# Patient Record
Sex: MALE | Race: WHITE | Employment: OTHER | URBAN - METROPOLITAN AREA
[De-identification: names, ages, dates, MRNs, and addresses within clinical notes are randomized per-mention and may not be internally consistent; named-entity substitution may affect disease eponyms.]

---

## 2023-05-29 ENCOUNTER — APPOINTMENT (OUTPATIENT)
Dept: CT IMAGING | Facility: HOSPITAL | Age: 88
End: 2023-05-29
Attending: EMERGENCY MEDICINE
Payer: MEDICARE

## 2023-05-29 ENCOUNTER — HOSPITAL ENCOUNTER (EMERGENCY)
Facility: HOSPITAL | Age: 88
Discharge: HOME | End: 2023-05-29
Attending: EMERGENCY MEDICINE
Payer: MEDICARE

## 2023-05-29 VITALS
TEMPERATURE: 98 F | DIASTOLIC BLOOD PRESSURE: 80 MMHG | RESPIRATION RATE: 18 BRPM | WEIGHT: 170 LBS | HEART RATE: 73 BPM | OXYGEN SATURATION: 100 % | SYSTOLIC BLOOD PRESSURE: 151 MMHG | HEIGHT: 72 IN | BODY MASS INDEX: 23.03 KG/M2

## 2023-05-29 DIAGNOSIS — R31.0 GROSS HEMATURIA: Primary | ICD-10-CM

## 2023-05-29 LAB
ALBUMIN SERPL-MCNC: 2.3 G/DL (ref 3.4–5)
ALBUMIN/GLOB SERPL: 0.5 {RATIO} (ref 1–2)
ALP LIVER SERPL-CCNC: 267 U/L
ALT SERPL-CCNC: 254 U/L
ANION GAP SERPL CALC-SCNC: 5 MMOL/L (ref 0–18)
APTT PPP: 33.2 SECONDS (ref 23.3–35.6)
AST SERPL-CCNC: 308 U/L (ref 15–37)
BASOPHILS # BLD AUTO: 0.03 X10(3) UL (ref 0–0.2)
BASOPHILS NFR BLD AUTO: 0.4 %
BILIRUB SERPL-MCNC: 1.2 MG/DL (ref 0.1–2)
BILIRUB UR QL STRIP.AUTO: NEGATIVE
BUN BLD-MCNC: 22 MG/DL (ref 7–18)
CALCIUM BLD-MCNC: 7.8 MG/DL (ref 8.5–10.1)
CHLORIDE SERPL-SCNC: 117 MMOL/L (ref 98–112)
CLARITY UR REFRACT.AUTO: CLEAR
CO2 SERPL-SCNC: 21 MMOL/L (ref 21–32)
CREAT BLD-MCNC: 1.48 MG/DL
EOSINOPHIL # BLD AUTO: 0.07 X10(3) UL (ref 0–0.7)
EOSINOPHIL NFR BLD AUTO: 0.9 %
ERYTHROCYTE [DISTWIDTH] IN BLOOD BY AUTOMATED COUNT: 15.9 %
GFR SERPLBLD BASED ON 1.73 SQ M-ARVRAT: 45 ML/MIN/1.73M2 (ref 60–?)
GLOBULIN PLAS-MCNC: 4.3 G/DL (ref 2.8–4.4)
GLUCOSE BLD-MCNC: 118 MG/DL (ref 70–99)
GLUCOSE UR STRIP.AUTO-MCNC: NEGATIVE MG/DL
HCT VFR BLD AUTO: 36.5 %
HGB BLD-MCNC: 11.9 G/DL
IMM GRANULOCYTES # BLD AUTO: 0.04 X10(3) UL (ref 0–1)
IMM GRANULOCYTES NFR BLD: 0.5 %
INR BLD: 1.3 (ref 0.85–1.16)
KETONES UR STRIP.AUTO-MCNC: NEGATIVE MG/DL
LYMPHOCYTES # BLD AUTO: 1.3 X10(3) UL (ref 1–4)
LYMPHOCYTES NFR BLD AUTO: 16.7 %
MCH RBC QN AUTO: 31.4 PG (ref 26–34)
MCHC RBC AUTO-ENTMCNC: 32.6 G/DL (ref 31–37)
MCV RBC AUTO: 96.3 FL
MONOCYTES # BLD AUTO: 0.69 X10(3) UL (ref 0.1–1)
MONOCYTES NFR BLD AUTO: 8.9 %
NEUTROPHILS # BLD AUTO: 5.64 X10 (3) UL (ref 1.5–7.7)
NEUTROPHILS # BLD AUTO: 5.64 X10(3) UL (ref 1.5–7.7)
NEUTROPHILS NFR BLD AUTO: 72.6 %
NITRITE UR QL STRIP.AUTO: NEGATIVE
OSMOLALITY SERPL CALC.SUM OF ELEC: 300 MOSM/KG (ref 275–295)
PH UR STRIP.AUTO: 6 [PH] (ref 5–8)
PLATELET # BLD AUTO: 179 10(3)UL (ref 150–450)
POTASSIUM SERPL-SCNC: 3.6 MMOL/L (ref 3.5–5.1)
PROT SERPL-MCNC: 6.6 G/DL (ref 6.4–8.2)
PROT UR STRIP.AUTO-MCNC: 100 MG/DL
PROTHROMBIN TIME: 16.1 SECONDS (ref 11.6–14.8)
RBC # BLD AUTO: 3.79 X10(6)UL
RBC #/AREA URNS AUTO: >10 /HPF
SODIUM SERPL-SCNC: 143 MMOL/L (ref 136–145)
SP GR UR STRIP.AUTO: 1.02 (ref 1–1.03)
UROBILINOGEN UR STRIP.AUTO-MCNC: 4 MG/DL
WBC # BLD AUTO: 7.8 X10(3) UL (ref 4–11)

## 2023-05-29 PROCEDURE — 87086 URINE CULTURE/COLONY COUNT: CPT | Performed by: EMERGENCY MEDICINE

## 2023-05-29 PROCEDURE — 85025 COMPLETE CBC W/AUTO DIFF WBC: CPT | Performed by: EMERGENCY MEDICINE

## 2023-05-29 PROCEDURE — 81001 URINALYSIS AUTO W/SCOPE: CPT | Performed by: EMERGENCY MEDICINE

## 2023-05-29 PROCEDURE — 99285 EMERGENCY DEPT VISIT HI MDM: CPT

## 2023-05-29 PROCEDURE — 96365 THER/PROPH/DIAG IV INF INIT: CPT

## 2023-05-29 PROCEDURE — 85610 PROTHROMBIN TIME: CPT | Performed by: EMERGENCY MEDICINE

## 2023-05-29 PROCEDURE — 74176 CT ABD & PELVIS W/O CONTRAST: CPT | Performed by: EMERGENCY MEDICINE

## 2023-05-29 PROCEDURE — 85730 THROMBOPLASTIN TIME PARTIAL: CPT | Performed by: EMERGENCY MEDICINE

## 2023-05-29 PROCEDURE — 80053 COMPREHEN METABOLIC PANEL: CPT | Performed by: EMERGENCY MEDICINE

## 2023-05-29 RX ORDER — ASCORBIC ACID 500 MG
TABLET ORAL
COMMUNITY

## 2023-05-29 RX ORDER — CHOLECALCIFEROL (VITAMIN D3) 125 MCG
500 CAPSULE ORAL DAILY
COMMUNITY

## 2023-05-29 RX ORDER — AMOXICILLIN 500 MG/1
500 TABLET, FILM COATED ORAL 2 TIMES DAILY
COMMUNITY

## 2023-05-29 NOTE — ED PROVIDER NOTES
CT ABDOMEN+PELVIS(CPT=74176)    Result Date: 5/29/2023  PROCEDURE:  CT ABDOMEN+PELVIS (CPT=74176)  LOCATION:  [facility]   COMPARISON:  None. INDICATIONS:  Bladder infection, bleeding with urination  TECHNIQUE:  Unenhanced multislice CT scanning was performed from the dome of the diaphragm to the pubic symphysis. Dose reduction techniques were used. Dose information is transmitted to the [facility]) NRDR ([address]) which includes the Dose Index Registry. PATIENT STATED HISTORY: (As transcribed by Technologist)  Patient had bladder infection and bleeding with urination. FINDINGS:  LIVER:  Right hepatic lobe 2.0 x 1.3 cm cyst. BILIARY:  Calcified gallstones in the gallbladder are noted. There is suggestion of subtle inflammatory changes adjacent to the gallbladder. PANCREAS:  No lesion, fluid collection, ductal dilatation, or atrophy. SPLEEN:  No enlargement or focal lesion. KIDNEYS:  Representative right renal parapelvic cyst measures 3.1 x 2.7 cm. Atrophic left kidney is noted. Left ureter is mildly distended with high density material that may represent blood. Cyst off the lower pole of the left kidney measures 2.9 x 2.8 cm. ADRENALS:  Left adrenal nodule measuring 1.5 x 1.1 cm measures 22 Hounsfield units. AORTA/VASCULAR:    Vascular calcifications are noted. RETROPERITONEUM:  No mass or adenopathy. BOWEL/MESENTERY:  Diverticulosis of the colon is noted. No evidence of acute diverticulitis. ABDOMINAL WALL:  No mass or hernia. URINARY BLADDER:  High density in the bladder is likely due to hemorrhage. Pat catheter is noted. PELVIC NODES:  No adenopathy. PELVIC ORGANS:  No visible mass. Pelvic organs appropriate for patient age. 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 LUNG BASES:  Small right pleural effusion along with bilateral lower lobe patchy consolidations. OTHER:  Negative. CONCLUSION:   1. Small right pleural effusion along with small bilateral lower lobe regions of atelectasis with minimal consolidation. 2. Left adrenal nodule measuring 1.5 x 1.1 cm is noted. Comparison with outside examination to demonstrate stability may be done. A nonemergent outpatient CT adrenal protocol may be done for further evaluation if needed. 3. Multiple calcified gallstones in the gallbladder with subtle inflammatory changes adjacent to the gallbladder extending towards the pancreatic head and duodenum is noted. This may represent developing acute cholecystitis. A follow-up ultrasound and/or HIDA scan may be done. 4. 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 Dictated by (CST): Alanna Salgado MD on 5/29/2023 at 4:26 PM     Finalized by (CST): Alanna Salgado MD on 5/29/2023 at 4:31 PM       ED RN irrigated Pat with a few small clots but no gross hematuria and Pat was draining freely. CT scan as above was discussed at length with the patient and patient's son who is a physician. Patient has no right upper quadrant tenderness and they do not want to pursue any further imaging of the gallbladder. With regard to the patient's elevated LFTs, this is been a known issue that has been followed by PCP in the [place] area, thought to be due to cholesterol medication, son states levels now actually seem to be trending down versus prior. Spoke again to urology.   Okay to continue amoxicillin and the office will reach out to the family this week to schedule follow-up

## 2023-05-29 NOTE — ED INITIAL ASSESSMENT (HPI)
Recently dx with UTI, states he is now having hematuria and had pain in his bladder last night. Denies any thinners. Denies fevers. Indwelling luis for retention. On amoxicillin. Recently stopped aspirin and statin.

## 2023-10-11 ENCOUNTER — HOSPITAL ENCOUNTER (INPATIENT)
Facility: HOSPITAL | Age: 88
LOS: 2 days | Discharge: HOME | End: 2023-10-13
Attending: EMERGENCY MEDICINE | Admitting: INTERNAL MEDICINE
Payer: MEDICARE

## 2023-10-11 ENCOUNTER — HOSPITAL ENCOUNTER (INPATIENT)
Facility: HOSPITAL | Age: 88
LOS: 2 days | Discharge: NURSING, REHAB OR LONG-TERM CARE | DRG: 696 | End: 2023-10-13
Attending: EMERGENCY MEDICINE | Admitting: INTERNAL MEDICINE
Payer: MEDICARE

## 2023-10-11 DIAGNOSIS — R33.9 URINARY RETENTION: ICD-10-CM

## 2023-10-11 DIAGNOSIS — R31.0 GROSS HEMATURIA: Primary | ICD-10-CM

## 2023-10-11 PROBLEM — D64.9 ANEMIA: Status: ACTIVE | Noted: 2023-10-11

## 2023-10-11 PROBLEM — E87.0 HYPERNATREMIA: Status: ACTIVE | Noted: 2023-10-11

## 2023-10-11 LAB
ALBUMIN SERPL-MCNC: 2.3 G/DL (ref 3.4–5)
ALBUMIN/GLOB SERPL: 0.5 {RATIO} (ref 1–2)
ALP LIVER SERPL-CCNC: 110 U/L
ALT SERPL-CCNC: 45 U/L
ANION GAP SERPL CALC-SCNC: 1 MMOL/L (ref 0–18)
AST SERPL-CCNC: 52 U/L (ref 15–37)
BASOPHILS # BLD AUTO: 0.04 X10(3) UL (ref 0–0.2)
BASOPHILS NFR BLD AUTO: 0.5 %
BILIRUB SERPL-MCNC: 0.4 MG/DL (ref 0.1–2)
BUN BLD-MCNC: 27 MG/DL (ref 7–18)
CALCIUM BLD-MCNC: 7.9 MG/DL (ref 8.5–10.1)
CHLORIDE SERPL-SCNC: 120 MMOL/L (ref 98–112)
CO2 SERPL-SCNC: 25 MMOL/L (ref 21–32)
CREAT BLD-MCNC: 1.63 MG/DL
EGFRCR SERPLBLD CKD-EPI 2021: 40 ML/MIN/1.73M2 (ref 60–?)
EOSINOPHIL # BLD AUTO: 0.23 X10(3) UL (ref 0–0.7)
EOSINOPHIL NFR BLD AUTO: 2.7 %
ERYTHROCYTE [DISTWIDTH] IN BLOOD BY AUTOMATED COUNT: 14.8 %
GLOBULIN PLAS-MCNC: 4.7 G/DL (ref 2.8–4.4)
GLUCOSE BLD-MCNC: 94 MG/DL (ref 70–99)
HCT VFR BLD AUTO: 35.4 %
HGB BLD-MCNC: 11.3 G/DL
IMM GRANULOCYTES # BLD AUTO: 0.06 X10(3) UL (ref 0–1)
IMM GRANULOCYTES NFR BLD: 0.7 %
LYMPHOCYTES # BLD AUTO: 2.06 X10(3) UL (ref 1–4)
LYMPHOCYTES NFR BLD AUTO: 24 %
MCH RBC QN AUTO: 31.8 PG (ref 26–34)
MCHC RBC AUTO-ENTMCNC: 31.9 G/DL (ref 31–37)
MCV RBC AUTO: 99.7 FL
MONOCYTES # BLD AUTO: 0.74 X10(3) UL (ref 0.1–1)
MONOCYTES NFR BLD AUTO: 8.6 %
NEUTROPHILS # BLD AUTO: 5.45 X10 (3) UL (ref 1.5–7.7)
NEUTROPHILS # BLD AUTO: 5.45 X10(3) UL (ref 1.5–7.7)
NEUTROPHILS NFR BLD AUTO: 63.5 %
OSMOLALITY SERPL CALC.SUM OF ELEC: 307 MOSM/KG (ref 275–295)
PLATELET # BLD AUTO: 219 10(3)UL (ref 150–450)
POTASSIUM SERPL-SCNC: 4.3 MMOL/L (ref 3.5–5.1)
PROT SERPL-MCNC: 7 G/DL (ref 6.4–8.2)
RBC # BLD AUTO: 3.55 X10(6)UL
SODIUM SERPL-SCNC: 146 MMOL/L (ref 136–145)
SP GR UR REFRACTOMETRY: 1.03 (ref 1–1.03)
WBC # BLD AUTO: 8.6 X10(3) UL (ref 4–11)

## 2023-10-11 PROCEDURE — 99223 1ST HOSP IP/OBS HIGH 75: CPT | Performed by: INTERNAL MEDICINE

## 2023-10-11 PROCEDURE — 0T2BX0Z CHANGE DRAINAGE DEVICE IN BLADDER, EXTERNAL APPROACH: ICD-10-PCS | Performed by: EMERGENCY MEDICINE

## 2023-10-11 RX ORDER — ONDANSETRON 2 MG/ML
4 INJECTION INTRAMUSCULAR; INTRAVENOUS EVERY 6 HOURS PRN
Status: DISCONTINUED | OUTPATIENT
Start: 2023-10-11 | End: 2023-10-13

## 2023-10-11 RX ORDER — MELATONIN
3 NIGHTLY PRN
Status: DISCONTINUED | OUTPATIENT
Start: 2023-10-11 | End: 2023-10-13

## 2023-10-11 RX ORDER — LIDOCAINE HYDROCHLORIDE 20 MG/ML
JELLY TOPICAL
Status: COMPLETED
Start: 2023-10-11 | End: 2023-10-11

## 2023-10-11 RX ORDER — SENNOSIDES 8.6 MG
17.2 TABLET ORAL NIGHTLY PRN
Status: DISCONTINUED | OUTPATIENT
Start: 2023-10-11 | End: 2023-10-13

## 2023-10-11 RX ORDER — POLYETHYLENE GLYCOL 3350 17 G/17G
17 POWDER, FOR SOLUTION ORAL DAILY PRN
Status: DISCONTINUED | OUTPATIENT
Start: 2023-10-11 | End: 2023-10-13

## 2023-10-11 RX ORDER — LIDOCAINE HYDROCHLORIDE 20 MG/ML
10 JELLY TOPICAL ONCE
Status: COMPLETED | OUTPATIENT
Start: 2023-10-11 | End: 2023-10-11

## 2023-10-11 RX ORDER — DEXTROSE MONOHYDRATE 50 MG/ML
INJECTION, SOLUTION INTRAVENOUS CONTINUOUS
Status: DISCONTINUED | OUTPATIENT
Start: 2023-10-11 | End: 2023-10-12

## 2023-10-11 RX ORDER — BISACODYL 10 MG
10 SUPPOSITORY, RECTAL RECTAL
Status: DISCONTINUED | OUTPATIENT
Start: 2023-10-11 | End: 2023-10-13

## 2023-10-12 LAB
ANION GAP SERPL CALC-SCNC: 5 MMOL/L (ref 0–18)
BASOPHILS # BLD AUTO: 0.03 X10(3) UL (ref 0–0.2)
BASOPHILS NFR BLD AUTO: 0.3 %
BUN BLD-MCNC: 28 MG/DL (ref 7–18)
CALCIUM BLD-MCNC: 7.7 MG/DL (ref 8.5–10.1)
CHLORIDE SERPL-SCNC: 121 MMOL/L (ref 98–112)
CO2 SERPL-SCNC: 23 MMOL/L (ref 21–32)
CREAT BLD-MCNC: 1.41 MG/DL
EGFRCR SERPLBLD CKD-EPI 2021: 47 ML/MIN/1.73M2 (ref 60–?)
EOSINOPHIL # BLD AUTO: 0.17 X10(3) UL (ref 0–0.7)
EOSINOPHIL NFR BLD AUTO: 1.9 %
ERYTHROCYTE [DISTWIDTH] IN BLOOD BY AUTOMATED COUNT: 14.9 %
GLUCOSE BLD-MCNC: 93 MG/DL (ref 70–99)
HCT VFR BLD AUTO: 30.5 %
HGB BLD-MCNC: 9.7 G/DL
IMM GRANULOCYTES # BLD AUTO: 0.05 X10(3) UL (ref 0–1)
IMM GRANULOCYTES NFR BLD: 0.6 %
LYMPHOCYTES # BLD AUTO: 1.89 X10(3) UL (ref 1–4)
LYMPHOCYTES NFR BLD AUTO: 20.8 %
MCH RBC QN AUTO: 31.2 PG (ref 26–34)
MCHC RBC AUTO-ENTMCNC: 31.8 G/DL (ref 31–37)
MCV RBC AUTO: 98.1 FL
MONOCYTES # BLD AUTO: 0.73 X10(3) UL (ref 0.1–1)
MONOCYTES NFR BLD AUTO: 8 %
NEUTROPHILS # BLD AUTO: 6.21 X10 (3) UL (ref 1.5–7.7)
NEUTROPHILS # BLD AUTO: 6.21 X10(3) UL (ref 1.5–7.7)
NEUTROPHILS NFR BLD AUTO: 68.4 %
OSMOLALITY SERPL CALC.SUM OF ELEC: 313 MOSM/KG (ref 275–295)
PLATELET # BLD AUTO: 187 10(3)UL (ref 150–450)
POTASSIUM SERPL-SCNC: 4.2 MMOL/L (ref 3.5–5.1)
RBC # BLD AUTO: 3.11 X10(6)UL
SODIUM SERPL-SCNC: 139 MMOL/L (ref 136–145)
SODIUM SERPL-SCNC: 149 MMOL/L (ref 136–145)
WBC # BLD AUTO: 9.1 X10(3) UL (ref 4–11)

## 2023-10-12 PROCEDURE — 99232 SBSQ HOSP IP/OBS MODERATE 35: CPT | Performed by: HOSPITALIST

## 2023-10-12 RX ORDER — DEXTROSE MONOHYDRATE 50 MG/ML
INJECTION, SOLUTION INTRAVENOUS CONTINUOUS
Status: DISCONTINUED | OUTPATIENT
Start: 2023-10-12 | End: 2023-10-12

## 2023-10-12 NOTE — PLAN OF CARE
Patient unaware of what home medication he takes. Refused to allow this RN call family to verify. Pt states he will have his wife or son inform us about medication when they arrive tomorrow morning. MD made aware.

## 2023-10-12 NOTE — ED QUICK NOTES
Orders for admission, patient is aware of plan and ready to go upstairs. Any questions, please call ED RN Vivian Irby at extension [phone].      Patient Covid vaccination status: Unvaccinated     COVID Test Ordered in ED: None    COVID Suspicion at Admission: N/A    Running Infusions:  None    Mental Status/LOC at time of transport: A&Ox3    Other pertinent information: NPO, has 24 F 3 way catheter in with CBI going, 3rd bag going  CIWA score: N/A   NIH score:  N/A

## 2023-10-12 NOTE — CM/SW NOTE
10/12/23 1300   CM/SW Referral Data   Referral Source Social Work (self-referral)   Reason for Referral Discharge planning   Informant Patient;EMR;Clinical Staff Member   Patient Info   Patient's Current Mental Status at Time of Assessment Alert;Oriented   Patient's Home Environment Assisted Living   Post Acute Care Provider Upon Admission [facility]   Patient lives with Spouse/Significant other   Discharge Needs   Anticipated D/C needs Assisted/Supported living;Transportation services       Order received for advance directives. Pt is a 81 y/o man admitted with gross hematuria. PT recommending home at [place]. Met with pt who stated he has lives at [facility] for about 4 months. He shares an apartment with his wife who has dementia. Pt confirmed that he does have advance directives. He is eager to return to [facility] and hoping for [place] soon. Contacted [facility] and spoke with RN who confirmed advance directives on file. She will fax records to add to pt's hospital chart. / to remain available for support and/or discharge planning. At [place], RN please call report to:   [address] [phone]    Itzel Martinez [facility]  Discharge Planner  [phone]    Addendum: Received call from [facility] clarifying that they do not have any advance directives on file. Pt is full code. Updated RN. Recommended consult to  if pt/family would like to establish HCPOA. MD can discuss code status and POLST completed as appropriate.

## 2023-10-12 NOTE — PHYSICAL THERAPY NOTE
PHYSICAL THERAPY EVALUATION - INPATIENT     Room Number: 668/994-H  Evaluation Date: 10/12/2023  Type of Evaluation: Initial  Physician Order: PT Eval and Treat    Presenting Problem: gross hematuria  Co-Morbidities : HTN, neuropathy, luis, urinary retention  Reason for Therapy: Mobility Dysfunction and Discharge Planning    History related to current admission: Patient is a 80year old male admitted on 10/11/2023 from home for gross hematuria. Urology following. ASSESSMENT   In this PT evaluation, the patient presents with the following impairments decreased functional activity tolerance. These impairments and comorbidities manifest themselves as functional limitations in independent bed mobility, transfers, and gait. The patient is below baseline and would benefit from skilled inpatient PT to address the above deficits to assist patient in returning to prior to level of function. Functional outcome measures completed include [facility]. The AM-PAC '6-Clicks' Inpatient Basic Mobility Short Form was completed and this patient is demonstrating a Approx Degree of Impairment: 35.83%  degree of impairment in mobility. Research supports that patients with this level of impairment may benefit from home. DISCHARGE RECOMMENDATIONS  PT Discharge Recommendations: Home    PLAN  PT Treatment Plan: Bed mobility; Endurance; Energy conservation; Family education;Patient education;Gait training;Range of motion;Strengthening;Transfer training;Balance training  Rehab Potential : Good  Frequency (Obs): 3-5x/week  Number of Visits to Meet Established Goals: 1      CURRENT GOALS    Goal #1 Patient is able to demonstrate supine - sit EOB @ level: modified independent     Goal #2 Patient is able to demonstrate transfers Sit to/from Stand at assistance level: modified independent     Goal #3 Patient is able to ambulate 150 feet with assist device: walker - rolling at assistance level: supervision     Goal #4    Goal #5 Goal #6    Goal Comments: Goals established on 10/12/2023    HOME SITUATION  Type of Home: Assisted living facility [facility])   Home Layout: One level                Lives With: Staff 24 hours  Drives: Yes  Patient Owned Equipment: Rolling walker       Prior Level of [place]: Pt is typically mod ind with RW and mod ind for ADLs. Pt reports that starting next week he is going to get assist with showers and \"cleaning up after the luis\". SUBJECTIVE  Pt pleasant and cooperative during session. OBJECTIVE  Precautions: Bed/chair alarm  Fall Risk: Standard fall risk    WEIGHT BEARING RESTRICTION  Weight Bearing Restriction: None                PAIN ASSESSMENT  Ratin          COGNITION  Overall Cognitive Status:  WFL - within functional limits    RANGE OF MOTION AND STRENGTH ASSESSMENT  Upper extremity ROM and strength are within functional limits     Lower extremity ROM is within functional limits     Lower extremity strength is within functional limits       BALANCE  Static Sitting: Good  Dynamic Sitting: Good  Static Standing: Fair -  Dynamic Standing: Fair -    ADDITIONAL TESTS                                  ACTIVITY TOLERANCE   Asymptomatic vitals during session                      O2 WALK       NEUROLOGICAL FINDINGS                        AM-PAC '6-Clicks' INPATIENT SHORT FORM - BASIC MOBILITY  How much difficulty does the patient currently have. .. Patient Difficulty: Turning over in bed (including adjusting bedclothes, sheets and blankets)?: None   Patient Difficulty: Sitting down on and standing up from a chair with arms (e.g., wheelchair, bedside commode, etc.): A Little   Patient Difficulty: Moving from lying on back to sitting on the side of the bed?: None   How much help from another person does the patient currently need. ..    Help from Another: Moving to and from a bed to a chair (including a wheelchair)?: A Little   Help from Another: Need to walk in hospital room?: A Little   Help from Another: Climbing 3-5 steps with a railing?: A Little       AM-PAC Score:  Raw Score: 20   Approx Degree of Impairment: 35.83%   Standardized Score (AM-PAC Scale): 47.67   CMS Modifier (G-Code): CJ    FUNCTIONAL ABILITY STATUS  Gait Assessment   Functional Mobility/Gait Assessment  Gait Assistance: Supervision  Distance (ft): 2  Assistive Device: Rolling walker  Pattern: Within Functional Limits    Skilled Therapy Provided: Per RN okay to work with pt. Pt received in chair and was agreeable to therapy session. Bed Mobility:  Rolling: mod ind   Supine to sit: NT   Sit to supine: supervision     Transfer Mobility:  Sit to stand: supervision   Stand to sit: supervision  Gait = pt ambulated from chair to bed with RW and supervision     Therapist's Comments: Pt educated on role of therapy, goals for session, safety, fall prevention, activity recommendation, and discharge planning. Exercise/Education Provided:  Bed mobility  Energy conservation  Functional activity tolerated  Gait training  Posture  Strengthening  Transfer training    Patient End of Session: In bed; With [facility] staff;Needs met;Call light within reach;RN aware of session/findings; All patient questions and concerns addressed;SCDs in place; Alarm set; Discussed recommendations with /      Patient Evaluation Complexity Level:  History Moderate - 1 or 2 personal factors and/or co-morbidities   Examination of body systems Low - addressing 1-2 elements   Clinical Presentation Low - Stable   Clinical Decision Making Low - Stable       PT Session Time: 15 minutes

## 2023-10-12 NOTE — PLAN OF CARE
Patient alert and oriented x4. VSS, on RA. Pt denies pain. IVF infusing per orders. Tolerating PO intake, denies N/V. Pt up x1 person assist with walker. CBI running at moderate rate, output pink/clear, intermittent clots in tubing. No hand irrigation. Plan: continue CBI.

## 2023-10-12 NOTE — PLAN OF CARE
Pt A&O x 4 , on RA, BM-10/12-large BM, Pain controlled with current regimen, up with x1ast with walker, dressing  to L elbow and Coccyx -cdi, CBI running-fast, triple lumen luis intact, IVF running, , VSS, SCD's in place, BSC.

## 2023-10-13 VITALS
OXYGEN SATURATION: 94 % | RESPIRATION RATE: 16 BRPM | TEMPERATURE: 99 F | SYSTOLIC BLOOD PRESSURE: 95 MMHG | DIASTOLIC BLOOD PRESSURE: 42 MMHG | BODY MASS INDEX: 20.02 KG/M2 | HEIGHT: 71 IN | HEART RATE: 92 BPM | WEIGHT: 143 LBS

## 2023-10-13 LAB
ANION GAP SERPL CALC-SCNC: 3 MMOL/L (ref 0–18)
BASOPHILS # BLD AUTO: 0.04 X10(3) UL (ref 0–0.2)
BASOPHILS NFR BLD AUTO: 0.4 %
BUN BLD-MCNC: 25 MG/DL (ref 7–18)
CALCIUM BLD-MCNC: 7.8 MG/DL (ref 8.5–10.1)
CHLORIDE SERPL-SCNC: 118 MMOL/L (ref 98–112)
CO2 SERPL-SCNC: 22 MMOL/L (ref 21–32)
CREAT BLD-MCNC: 1.15 MG/DL
EGFRCR SERPLBLD CKD-EPI 2021: 60 ML/MIN/1.73M2 (ref 60–?)
EOSINOPHIL # BLD AUTO: 0.24 X10(3) UL (ref 0–0.7)
EOSINOPHIL NFR BLD AUTO: 2.5 %
ERYTHROCYTE [DISTWIDTH] IN BLOOD BY AUTOMATED COUNT: 14.6 %
GLUCOSE BLD-MCNC: 82 MG/DL (ref 70–99)
HCT VFR BLD AUTO: 27.6 %
HGB BLD-MCNC: 9 G/DL
IMM GRANULOCYTES # BLD AUTO: 0.05 X10(3) UL (ref 0–1)
IMM GRANULOCYTES NFR BLD: 0.5 %
LYMPHOCYTES # BLD AUTO: 2.15 X10(3) UL (ref 1–4)
LYMPHOCYTES NFR BLD AUTO: 22.6 %
MCH RBC QN AUTO: 31.7 PG (ref 26–34)
MCHC RBC AUTO-ENTMCNC: 32.6 G/DL (ref 31–37)
MCV RBC AUTO: 97.2 FL
MONOCYTES # BLD AUTO: 0.74 X10(3) UL (ref 0.1–1)
MONOCYTES NFR BLD AUTO: 7.8 %
NEUTROPHILS # BLD AUTO: 6.28 X10 (3) UL (ref 1.5–7.7)
NEUTROPHILS # BLD AUTO: 6.28 X10(3) UL (ref 1.5–7.7)
NEUTROPHILS NFR BLD AUTO: 66.2 %
OSMOLALITY SERPL CALC.SUM OF ELEC: 299 MOSM/KG (ref 275–295)
PLATELET # BLD AUTO: 184 10(3)UL (ref 150–450)
POTASSIUM SERPL-SCNC: 4.3 MMOL/L (ref 3.5–5.1)
RBC # BLD AUTO: 2.84 X10(6)UL
SODIUM SERPL-SCNC: 143 MMOL/L (ref 136–145)
WBC # BLD AUTO: 9.5 X10(3) UL (ref 4–11)

## 2023-10-13 PROCEDURE — 99232 SBSQ HOSP IP/OBS MODERATE 35: CPT | Performed by: HOSPITALIST

## 2023-10-13 NOTE — PLAN OF CARE
Patient Aox3 this morning, denies pain at rest, VSS on RA, neurovascularly intact, CBI tubing present, currently clamped, urine to tubing has faint rust color, monitoring output to look for change of color, if urine remains the same color will change to 18 [place] cath prior to discharge.

## 2023-10-13 NOTE — PROGRESS NOTES
Patient luis draining faint rust tinted urine to tubing, color has appeared the same as it was this morning with CBI clamped, no clots present, will switch to 18 Romansh catheter per urology orders and plan for discharge this evening.

## 2023-10-13 NOTE — PLAN OF CARE
A&O x2-3 overnight, frequently confused/forgetful. RN spoke with RN at [facility] who confirms this is baseline for pt. VSS on RA. . Denies pain. Ambulating with min assist with walker. 3 way luis in place. CBI running at fast rate, output cherry/pink with some clots. Manual irrigation done x1. Bilateral SCDs. Fluid restriction maintained. Reviewed POC, pain management, IS use, and fall precautions with pt. Bed alarm on w/bed in lowest position. Pt reminded to use call light.

## 2023-10-13 NOTE — CM/SW NOTE
Spoke with pt's RN who stated pt can return to [facility] today. Updated Renny Moffett at [facility]. Spoke with pt's son who can provide transport at 3-4pm today. No other DC needs/concerns at this time. Updated RN who will call report to intermediate. / to remain available for support and/or discharge planning.      RN please call report to:   [address] [phone]    Keny Mariano [facility]  Discharge Planner  [phone]

## 2023-10-13 NOTE — DISCHARGE INSTRUCTIONS
Doctor has asked you to schedule a Cystoscopy    This is a procedure done in the office. There is no preparation needed on your part. A Cystoscopy is a test that allows your doctor to look the inside the bladder and the urethra using a thin, lighted instrument called a cystoscope. The cystoscope is inserted into your urethra and slowly advanced into the bladder. A cystoscopy allows your doctor to look at areas of your bladder and urethra that usually do not show up well on X-rays. Tiny surgical instruments can be inserted through the cystoscope that allow your doctor to remove samples of tissue (biopsy) or samples of urine. Small bladder stones and some small growths can be removed during cystoscopy. This may eliminate the need for more extensive surgery. Why It Is Done  Find the cause of symptoms such as blood in the urine (hematuria), painful urination (dysuria), urinary incontinence, urinary frequency or hesitancy, an inability to pass urine (retention), or a sudden and overwhelming need to urinate (urgency). Find the cause of problems of the urinary tract, such as frequent, repeated urinary tract infections or urinary tract infections that do not respond to treatment. Look for problems in the urinary tract, such as blockage in the urethra caused by an enlarged prostate, kidney stones, or tumors. Evaluate problems that cannot be seen on X-ray or to further investigate problems detected by ultrasound or during intravenous pyelography, such as kidney stones or tumors. Remove tissue samples for biopsy. Remove foreign objects. Treat urinary tract problems. For example, cystoscopy can be done to remove urinary tract stones or growths, treat bleeding in the bladder, relieve blockages in the urethra, or treat or remove tumors. How To Prepare  You may eat and drink normally before the procedure. You may be asked to give a urine sample at the time of your procedure. Please do not urinate before.   How It Is Done  A cystoscopy is performed by a urologist, with one or more assistants. The test is done in a special testing room in the doctor's office. You will undress from the waist down, and be given a paper drape to cover yourself. You will lie on your back on a special table. Females will have their knees bent and feet placed in stirrups. Males will lay flat on the table, legs straight. Your genital area is cleaned with an antiseptic solution. A local anesthetic jelly will be inserted into the urethra. No needles are used. After the anesthetic takes effect, a well-lubricated cystoscope is inserted into your urethra and slowly advanced into your bladder. If your urethra has a spot that is too narrow to allow the scope to pass, other smaller instruments are inserted first to gradually enlarge the opening. After the cystoscope is inside your bladder, sterile water runs through the scope to help expand your bladder and to create a clear view. Tiny instruments may be inserted through the scope to collect tissue samples for a biopsy if necessary; the tissue samples are sent to the laboratory for analysis. Cautery may be used if bleeding occurs from a specimen site. The cystoscope is usually in your bladder for only 1-2 minutes. But the entire test with preparation may take up to 20 minutes or longer. You will be able to get up immediately following the procedure and proceed with normal daily activities. After the test  You may need to urinate frequently. You may experience some burning during urination for a day or two. Drink lots of fluids to help minimize the burning and help prevent a urinary tract infection. You may also see a tinge of blood in the urine for 2-3 days. Some patients experience pain for a few days afterwards. This is normal.  If the pain is intolerable please contact our office or go to the nearest Emergency Room/Immediate Care.    You will be given an instruction sheet following your cystoscopy with post procedure instructions. Results  Your doctor may be able to talk to you about the results during the cystoscopy. If a biopsy is preformed, the results usually take several days to become available. You will be called promptly with results.

## 2023-10-14 ENCOUNTER — HOSPITAL ENCOUNTER (INPATIENT)
Facility: HOSPITAL | Age: 88
LOS: 2 days | Discharge: HOME HEALTH | DRG: 717 | End: 2023-10-16
Attending: EMERGENCY MEDICINE | Admitting: HOSPITALIST

## 2023-10-14 ENCOUNTER — APPOINTMENT (OUTPATIENT)
Dept: GENERAL RADIOLOGY | Facility: HOSPITAL | Age: 88
DRG: 717 | End: 2023-10-14
Attending: EMERGENCY MEDICINE

## 2023-10-14 DIAGNOSIS — J18.9 COMMUNITY ACQUIRED PNEUMONIA OF RIGHT LOWER LOBE OF LUNG: ICD-10-CM

## 2023-10-14 DIAGNOSIS — K92.2 GASTROINTESTINAL HEMORRHAGE, UNSPECIFIED GASTROINTESTINAL HEMORRHAGE TYPE: ICD-10-CM

## 2023-10-14 DIAGNOSIS — I95.1 ORTHOSTATIC HYPOTENSION: Primary | ICD-10-CM

## 2023-10-14 DIAGNOSIS — R31.0 GROSS HEMATURIA: ICD-10-CM

## 2023-10-14 PROBLEM — I95.9 HYPOTENSION: Status: ACTIVE | Noted: 2023-10-14

## 2023-10-14 PROBLEM — R31.9 HEMATURIA: Status: ACTIVE | Noted: 2023-10-14

## 2023-10-14 PROBLEM — E87.20 METABOLIC ACIDOSIS: Status: ACTIVE | Noted: 2023-10-14

## 2023-10-14 PROBLEM — N17.9 ACUTE KIDNEY INJURY (HCC): Status: ACTIVE | Noted: 2023-10-14

## 2023-10-14 PROBLEM — D69.6 THROMBOCYTOPENIA (HCC): Status: ACTIVE | Noted: 2023-10-14

## 2023-10-14 LAB
ADENOVIRUS PCR:: NOT DETECTED
ALBUMIN SERPL-MCNC: 2.1 G/DL (ref 3.4–5)
ALBUMIN/GLOB SERPL: 0.5 {RATIO} (ref 1–2)
ALP LIVER SERPL-CCNC: 84 U/L
ALT SERPL-CCNC: 34 U/L
ANION GAP SERPL CALC-SCNC: 4 MMOL/L (ref 0–18)
ANTIBODY SCREEN: NEGATIVE
AST SERPL-CCNC: 44 U/L (ref 15–37)
B PARAPERT DNA SPEC QL NAA+PROBE: NOT DETECTED
B PERT DNA SPEC QL NAA+PROBE: NOT DETECTED
BASOPHILS # BLD AUTO: 0.02 X10(3) UL (ref 0–0.2)
BASOPHILS NFR BLD AUTO: 0.2 %
BILIRUB SERPL-MCNC: 0.6 MG/DL (ref 0.1–2)
BUN BLD-MCNC: 32 MG/DL (ref 7–18)
C PNEUM DNA SPEC QL NAA+PROBE: NOT DETECTED
CALCIUM BLD-MCNC: 7.8 MG/DL (ref 8.5–10.1)
CHLORIDE SERPL-SCNC: 118 MMOL/L (ref 98–112)
CO2 SERPL-SCNC: 21 MMOL/L (ref 21–32)
CORONAVIRUS 229E PCR:: NOT DETECTED
CORONAVIRUS HKU1 PCR:: NOT DETECTED
CORONAVIRUS NL63 PCR:: NOT DETECTED
CORONAVIRUS OC43 PCR:: NOT DETECTED
CREAT BLD-MCNC: 1.67 MG/DL
EGFRCR SERPLBLD CKD-EPI 2021: 39 ML/MIN/1.73M2 (ref 60–?)
EOSINOPHIL # BLD AUTO: 0.03 X10(3) UL (ref 0–0.7)
EOSINOPHIL NFR BLD AUTO: 0.3 %
ERYTHROCYTE [DISTWIDTH] IN BLOOD BY AUTOMATED COUNT: 14.7 %
FLUAV RNA SPEC QL NAA+PROBE: NOT DETECTED
FLUBV RNA SPEC QL NAA+PROBE: NOT DETECTED
GLOBULIN PLAS-MCNC: 3.9 G/DL (ref 2.8–4.4)
GLUCOSE BLD-MCNC: 120 MG/DL (ref 70–99)
HCT VFR BLD AUTO: 27.3 %
HGB BLD-MCNC: 8.9 G/DL
IMM GRANULOCYTES # BLD AUTO: 0.05 X10(3) UL (ref 0–1)
IMM GRANULOCYTES NFR BLD: 0.5 %
LACTATE SERPL-SCNC: 1.1 MMOL/L (ref 0.4–2)
LYMPHOCYTES # BLD AUTO: 1.48 X10(3) UL (ref 1–4)
LYMPHOCYTES NFR BLD AUTO: 15.5 %
MCH RBC QN AUTO: 31.8 PG (ref 26–34)
MCHC RBC AUTO-ENTMCNC: 32.6 G/DL (ref 31–37)
MCV RBC AUTO: 97.5 FL
METAPNEUMOVIRUS PCR:: NOT DETECTED
MONOCYTES # BLD AUTO: 0.51 X10(3) UL (ref 0.1–1)
MONOCYTES NFR BLD AUTO: 5.3 %
MYCOPLASMA PNEUMONIA PCR:: NOT DETECTED
NEUTROPHILS # BLD AUTO: 7.48 X10 (3) UL (ref 1.5–7.7)
NEUTROPHILS # BLD AUTO: 7.48 X10(3) UL (ref 1.5–7.7)
NEUTROPHILS NFR BLD AUTO: 78.2 %
OSMOLALITY SERPL CALC.SUM OF ELEC: 304 MOSM/KG (ref 275–295)
PARAINFLUENZA 1 PCR:: NOT DETECTED
PARAINFLUENZA 2 PCR:: NOT DETECTED
PARAINFLUENZA 3 PCR:: NOT DETECTED
PARAINFLUENZA 4 PCR:: NOT DETECTED
PLATELET # BLD AUTO: 113 10(3)UL (ref 150–450)
POTASSIUM SERPL-SCNC: 4.2 MMOL/L (ref 3.5–5.1)
PROCALCITONIN SERPL-MCNC: 0.12 NG/ML (ref ?–0.16)
PROT SERPL-MCNC: 6 G/DL (ref 6.4–8.2)
RBC # BLD AUTO: 2.8 X10(6)UL
RH BLOOD TYPE: POSITIVE
RHINOVIRUS/ENTERO PCR:: DETECTED
RSV RNA SPEC QL NAA+PROBE: NOT DETECTED
SARS-COV-2 RNA NPH QL NAA+NON-PROBE: NOT DETECTED
SODIUM SERPL-SCNC: 143 MMOL/L (ref 136–145)
WBC # BLD AUTO: 9.6 X10(3) UL (ref 4–11)

## 2023-10-14 PROCEDURE — 99223 1ST HOSP IP/OBS HIGH 75: CPT | Performed by: HOSPITALIST

## 2023-10-14 PROCEDURE — 71045 X-RAY EXAM CHEST 1 VIEW: CPT | Performed by: EMERGENCY MEDICINE

## 2023-10-14 RX ORDER — SENNOSIDES 8.6 MG
17.2 TABLET ORAL NIGHTLY PRN
Status: DISCONTINUED | OUTPATIENT
Start: 2023-10-14 | End: 2023-10-16

## 2023-10-14 RX ORDER — ONDANSETRON 2 MG/ML
4 INJECTION INTRAMUSCULAR; INTRAVENOUS EVERY 6 HOURS PRN
Status: DISCONTINUED | OUTPATIENT
Start: 2023-10-14 | End: 2023-10-16

## 2023-10-14 RX ORDER — POLYETHYLENE GLYCOL 3350 17 G/17G
17 POWDER, FOR SOLUTION ORAL DAILY PRN
Status: DISCONTINUED | OUTPATIENT
Start: 2023-10-14 | End: 2023-10-16

## 2023-10-14 RX ORDER — BISACODYL 10 MG
10 SUPPOSITORY, RECTAL RECTAL
Status: DISCONTINUED | OUTPATIENT
Start: 2023-10-14 | End: 2023-10-16

## 2023-10-14 RX ORDER — SODIUM CHLORIDE 9 MG/ML
INJECTION, SOLUTION INTRAVENOUS CONTINUOUS
Status: DISCONTINUED | OUTPATIENT
Start: 2023-10-14 | End: 2023-10-16

## 2023-10-14 RX ORDER — SODIUM CHLORIDE 9 MG/ML
INJECTION, SOLUTION INTRAVENOUS CONTINUOUS
Status: ACTIVE | OUTPATIENT
Start: 2023-10-14 | End: 2023-10-14

## 2023-10-14 RX ORDER — FLUTICASONE PROPIONATE 50 MCG
1 SPRAY, SUSPENSION (ML) NASAL DAILY
COMMUNITY

## 2023-10-14 RX ORDER — MAGNESIUM HYDROXIDE 1200 MG/15ML
3000 LIQUID ORAL CONTINUOUS
Status: DISCONTINUED | OUTPATIENT
Start: 2023-10-14 | End: 2023-10-16

## 2023-10-14 RX ORDER — ACETAMINOPHEN 500 MG
500 TABLET ORAL EVERY 4 HOURS PRN
Status: DISCONTINUED | OUTPATIENT
Start: 2023-10-14 | End: 2023-10-16

## 2023-10-14 RX ORDER — METOCLOPRAMIDE HYDROCHLORIDE 5 MG/ML
5 INJECTION INTRAMUSCULAR; INTRAVENOUS EVERY 8 HOURS PRN
Status: DISCONTINUED | OUTPATIENT
Start: 2023-10-14 | End: 2023-10-16

## 2023-10-14 NOTE — PROGRESS NOTES
NURSING ADMISSION NOTE      Patient admitted via Cart  Oriented to room. Safety precautions initiated. Bed in low position. Call light in reach. Pt received A&Ox3-4, [place]/forgetful. VSS. Denies pain. 3-way luis cath irrigated, bright red blood w/ large clots noted. CBI running fast. Per urology okay for diet today. Abd discomfort relieved w/ frequent irrigation. Admission navigator and med rec completed over phone w/ pt's son Camryn Genao. Per son, pt is DNR. Dr. Solomon Walker notified, SW consulted to complete POLST. Strong, wet cough - pulm consulted per orders. O2 sats stable on RA. SLP consulted for possible dysphagia, pt passed bedside screening. IS provided. PT/OT consulted. IVF infusing @ 100ml/hr. Fall and isolation precautions in place. Call light in reach. 1900 - Pt to be NPO @ [place] for possible cysto tomorrow per urology. CBI running fast w/ clear pink output.

## 2023-10-14 NOTE — ED QUICK NOTES
Received report from [facility]. Per RN, pt's CBI was discontinued by MD due to abdominal distention. Dr. Miriam Hewitt was notified, states urology is to come see patient due to lack of luis drainage. Pt's son states they needed urology to help placed 3 way that was last inserted. MD is aware, no new orders at this time.

## 2023-10-14 NOTE — ED INITIAL ASSESSMENT (HPI)
Pt to ED from [facility] after unwitnessed fall, pt was getting out of bed and started getting dizzy per medics. Not on blood thinners, arrived with c collar. Denies head, neck, and back pain. A&Ox2-3.

## 2023-10-14 NOTE — ED QUICK NOTES
Orders for admission, patient is aware of plan and ready to go upstairs. Any questions, please call ED RN Hali Miranda at extension [phone]      Titratable drug(s) infusing:   Rate: 0.9% NS @ 125 ml/hr.     LOC at time of transport: A&O x3    Other pertinent information:     CIWA score=  NIH score=

## 2023-10-15 ENCOUNTER — APPOINTMENT (OUTPATIENT)
Dept: CT IMAGING | Facility: HOSPITAL | Age: 88
DRG: 717 | End: 2023-10-15
Attending: UROLOGY

## 2023-10-15 ENCOUNTER — ANESTHESIA (OUTPATIENT)
Dept: SURGERY | Facility: HOSPITAL | Age: 88
End: 2023-10-15
Payer: MEDICARE

## 2023-10-15 ENCOUNTER — ANESTHESIA EVENT (OUTPATIENT)
Dept: SURGERY | Facility: HOSPITAL | Age: 88
End: 2023-10-15
Payer: MEDICARE

## 2023-10-15 LAB
ANION GAP SERPL CALC-SCNC: 5 MMOL/L (ref 0–18)
BASOPHILS # BLD AUTO: 0.05 X10(3) UL (ref 0–0.2)
BASOPHILS NFR BLD AUTO: 0.6 %
BUN BLD-MCNC: 27 MG/DL (ref 7–18)
CALCIUM BLD-MCNC: 7.5 MG/DL (ref 8.5–10.1)
CHLORIDE SERPL-SCNC: 122 MMOL/L (ref 98–112)
CO2 SERPL-SCNC: 20 MMOL/L (ref 21–32)
CREAT BLD-MCNC: 1.21 MG/DL
EGFRCR SERPLBLD CKD-EPI 2021: 57 ML/MIN/1.73M2 (ref 60–?)
EOSINOPHIL # BLD AUTO: 0.33 X10(3) UL (ref 0–0.7)
EOSINOPHIL NFR BLD AUTO: 4 %
ERYTHROCYTE [DISTWIDTH] IN BLOOD BY AUTOMATED COUNT: 14.7 %
GLUCOSE BLD-MCNC: 78 MG/DL (ref 70–99)
HCT VFR BLD AUTO: 24.7 %
HGB BLD-MCNC: 7.8 G/DL
IMM GRANULOCYTES # BLD AUTO: 0.05 X10(3) UL (ref 0–1)
IMM GRANULOCYTES NFR BLD: 0.6 %
LYMPHOCYTES # BLD AUTO: 1.39 X10(3) UL (ref 1–4)
LYMPHOCYTES NFR BLD AUTO: 16.7 %
MCH RBC QN AUTO: 31.8 PG (ref 26–34)
MCHC RBC AUTO-ENTMCNC: 31.6 G/DL (ref 31–37)
MCV RBC AUTO: 100.8 FL
MONOCYTES # BLD AUTO: 0.73 X10(3) UL (ref 0.1–1)
MONOCYTES NFR BLD AUTO: 8.8 %
NEUTROPHILS # BLD AUTO: 5.77 X10 (3) UL (ref 1.5–7.7)
NEUTROPHILS # BLD AUTO: 5.77 X10(3) UL (ref 1.5–7.7)
NEUTROPHILS NFR BLD AUTO: 69.3 %
OSMOLALITY SERPL CALC.SUM OF ELEC: 308 MOSM/KG (ref 275–295)
PLATELET # BLD AUTO: 164 10(3)UL (ref 150–450)
POTASSIUM SERPL-SCNC: 3.9 MMOL/L (ref 3.5–5.1)
RBC # BLD AUTO: 2.45 X10(6)UL
SODIUM SERPL-SCNC: 147 MMOL/L (ref 136–145)
WBC # BLD AUTO: 8.3 X10(3) UL (ref 4–11)

## 2023-10-15 PROCEDURE — 99232 SBSQ HOSP IP/OBS MODERATE 35: CPT | Performed by: HOSPITALIST

## 2023-10-15 PROCEDURE — 0TCB8ZZ EXTIRPATION OF MATTER FROM BLADDER, VIA NATURAL OR ARTIFICIAL OPENING ENDOSCOPIC: ICD-10-PCS | Performed by: UROLOGY

## 2023-10-15 PROCEDURE — 71250 CT THORAX DX C-: CPT | Performed by: UROLOGY

## 2023-10-15 PROCEDURE — 0W3R8ZZ CONTROL BLEEDING IN GENITOURINARY TRACT, VIA NATURAL OR ARTIFICIAL OPENING ENDOSCOPIC: ICD-10-PCS | Performed by: UROLOGY

## 2023-10-15 RX ORDER — ONDANSETRON 2 MG/ML
INJECTION INTRAMUSCULAR; INTRAVENOUS AS NEEDED
Status: DISCONTINUED | OUTPATIENT
Start: 2023-10-15 | End: 2023-10-15 | Stop reason: SURG

## 2023-10-15 RX ORDER — HYDROMORPHONE HYDROCHLORIDE 1 MG/ML
0.4 INJECTION, SOLUTION INTRAMUSCULAR; INTRAVENOUS; SUBCUTANEOUS EVERY 5 MIN PRN
Status: DISCONTINUED | OUTPATIENT
Start: 2023-10-15 | End: 2023-10-15 | Stop reason: HOSPADM

## 2023-10-15 RX ORDER — HYDROMORPHONE HYDROCHLORIDE 1 MG/ML
0.6 INJECTION, SOLUTION INTRAMUSCULAR; INTRAVENOUS; SUBCUTANEOUS EVERY 5 MIN PRN
Status: DISCONTINUED | OUTPATIENT
Start: 2023-10-15 | End: 2023-10-15 | Stop reason: HOSPADM

## 2023-10-15 RX ORDER — HYDROCODONE BITARTRATE AND ACETAMINOPHEN 5; 325 MG/1; MG/1
1 TABLET ORAL ONCE AS NEEDED
Status: DISCONTINUED | OUTPATIENT
Start: 2023-10-15 | End: 2023-10-15 | Stop reason: HOSPADM

## 2023-10-15 RX ORDER — CEFAZOLIN SODIUM 1 G/3ML
INJECTION, POWDER, FOR SOLUTION INTRAMUSCULAR; INTRAVENOUS AS NEEDED
Status: DISCONTINUED | OUTPATIENT
Start: 2023-10-15 | End: 2023-10-15 | Stop reason: SURG

## 2023-10-15 RX ORDER — ONDANSETRON 2 MG/ML
4 INJECTION INTRAMUSCULAR; INTRAVENOUS EVERY 6 HOURS PRN
Status: DISCONTINUED | OUTPATIENT
Start: 2023-10-15 | End: 2023-10-15 | Stop reason: HOSPADM

## 2023-10-15 RX ORDER — LIDOCAINE HYDROCHLORIDE 20 MG/ML
JELLY TOPICAL AS NEEDED
Status: DISCONTINUED | OUTPATIENT
Start: 2023-10-15 | End: 2023-10-15 | Stop reason: HOSPADM

## 2023-10-15 RX ORDER — SODIUM CHLORIDE, SODIUM LACTATE, POTASSIUM CHLORIDE, CALCIUM CHLORIDE 600; 310; 30; 20 MG/100ML; MG/100ML; MG/100ML; MG/100ML
INJECTION, SOLUTION INTRAVENOUS CONTINUOUS PRN
Status: DISCONTINUED | OUTPATIENT
Start: 2023-10-15 | End: 2023-10-15 | Stop reason: SURG

## 2023-10-15 RX ORDER — HYDROCODONE BITARTRATE AND ACETAMINOPHEN 5; 325 MG/1; MG/1
2 TABLET ORAL ONCE AS NEEDED
Status: DISCONTINUED | OUTPATIENT
Start: 2023-10-15 | End: 2023-10-15 | Stop reason: HOSPADM

## 2023-10-15 RX ORDER — ACETAMINOPHEN 500 MG
1000 TABLET ORAL ONCE AS NEEDED
Status: DISCONTINUED | OUTPATIENT
Start: 2023-10-15 | End: 2023-10-15 | Stop reason: HOSPADM

## 2023-10-15 RX ORDER — NALOXONE HYDROCHLORIDE 0.4 MG/ML
80 INJECTION, SOLUTION INTRAMUSCULAR; INTRAVENOUS; SUBCUTANEOUS AS NEEDED
Status: DISCONTINUED | OUTPATIENT
Start: 2023-10-15 | End: 2023-10-15 | Stop reason: HOSPADM

## 2023-10-15 RX ORDER — ACETAMINOPHEN 325 MG/1
650 TABLET ORAL ONCE
Status: DISCONTINUED | OUTPATIENT
Start: 2023-10-15 | End: 2023-10-15 | Stop reason: HOSPADM

## 2023-10-15 RX ORDER — SODIUM CHLORIDE, SODIUM LACTATE, POTASSIUM CHLORIDE, CALCIUM CHLORIDE 600; 310; 30; 20 MG/100ML; MG/100ML; MG/100ML; MG/100ML
INJECTION, SOLUTION INTRAVENOUS CONTINUOUS
Status: DISCONTINUED | OUTPATIENT
Start: 2023-10-15 | End: 2023-10-15 | Stop reason: HOSPADM

## 2023-10-15 RX ORDER — DIAZEPAM 2 MG/1
5 TABLET ORAL EVERY 8 HOURS PRN
Status: DISCONTINUED | OUTPATIENT
Start: 2023-10-15 | End: 2023-10-16

## 2023-10-15 RX ORDER — HYDROMORPHONE HYDROCHLORIDE 1 MG/ML
0.2 INJECTION, SOLUTION INTRAMUSCULAR; INTRAVENOUS; SUBCUTANEOUS EVERY 5 MIN PRN
Status: DISCONTINUED | OUTPATIENT
Start: 2023-10-15 | End: 2023-10-15 | Stop reason: HOSPADM

## 2023-10-15 RX ORDER — LIDOCAINE HYDROCHLORIDE 10 MG/ML
INJECTION, SOLUTION EPIDURAL; INFILTRATION; INTRACAUDAL; PERINEURAL AS NEEDED
Status: DISCONTINUED | OUTPATIENT
Start: 2023-10-15 | End: 2023-10-15 | Stop reason: SURG

## 2023-10-15 RX ORDER — LABETALOL HYDROCHLORIDE 5 MG/ML
5 INJECTION, SOLUTION INTRAVENOUS EVERY 5 MIN PRN
Status: DISCONTINUED | OUTPATIENT
Start: 2023-10-15 | End: 2023-10-15 | Stop reason: HOSPADM

## 2023-10-15 RX ORDER — DEXAMETHASONE SODIUM PHOSPHATE 4 MG/ML
VIAL (ML) INJECTION AS NEEDED
Status: DISCONTINUED | OUTPATIENT
Start: 2023-10-15 | End: 2023-10-15 | Stop reason: SURG

## 2023-10-15 RX ADMIN — LIDOCAINE HYDROCHLORIDE 50 MG: 10 INJECTION, SOLUTION EPIDURAL; INFILTRATION; INTRACAUDAL; PERINEURAL at 18:09:00

## 2023-10-15 RX ADMIN — CEFAZOLIN SODIUM 2 G: 1 INJECTION, POWDER, FOR SOLUTION INTRAMUSCULAR; INTRAVENOUS at 18:14:00

## 2023-10-15 RX ADMIN — SODIUM CHLORIDE, SODIUM LACTATE, POTASSIUM CHLORIDE, CALCIUM CHLORIDE: 600; 310; 30; 20 INJECTION, SOLUTION INTRAVENOUS at 18:05:00

## 2023-10-15 RX ADMIN — DEXAMETHASONE SODIUM PHOSPHATE 4 MG: 4 MG/ML VIAL (ML) INJECTION at 18:25:00

## 2023-10-15 RX ADMIN — ONDANSETRON 4 MG: 2 INJECTION INTRAMUSCULAR; INTRAVENOUS at 18:25:00

## 2023-10-15 RX ADMIN — SODIUM CHLORIDE, SODIUM LACTATE, POTASSIUM CHLORIDE, CALCIUM CHLORIDE: 600; 310; 30; 20 INJECTION, SOLUTION INTRAVENOUS at 18:28:00

## 2023-10-15 NOTE — SLP NOTE
SLP order received to evaluate oropharyngeal swallow d/t family noting overt s/s of aspiration with PO intake. Discussed with RN, patient currently NPO for procedure later today. SLP will continue to monitor and plan for evaluation 10/16.

## 2023-10-15 NOTE — ANESTHESIA POSTPROCEDURE EVALUATION
Yaritza Patient Status:  Inpatient   Age/Gender 89 [address] Gyear old male MRN [id number]   [facility] Attending Rajinder Jodi, [address] [facility] [address] PCP Marixa Moe DO       Anesthesia Post-op Note    CYSTOSCOPY , CLOT EVACUATION , FULGURATION OF BLEEDING    Procedure Summary       Date: 10/15/23 Room / Location: [facility] MAIN OR 07 / [facility] MAIN OR    Anesthesia Start: 4940 Anesthesia Stop: 3057    Procedure: CYSTOSCOPY , CLOT EVACUATION , FULGURATION OF BLEEDING Diagnosis:       Hematuria      Urinary retention      (Hematuria [R31. 9]Urinary retention [R33.9])    Surgeons: Tosin Chou MD Anesthesiologist: Ninoska Sanchez MD    Anesthesia Type: general ASA Status: 3            Anesthesia Type: general    Vitals Value Taken Time   BP 89/61 10/15/23 1854   Temp  10/15/23 1854   Pulse 74 10/15/23 1854   Resp 16 10/15/23 1854   SpO2 94 10/15/23 1854       Patient Location: PACU    Anesthesia Type: MAC    Airway Patency: patent    Postop Pain Control: adequate    Mental Status: mildly sedated but able to meaningfully participate in the post-anesthesia evaluation    Nausea/Vomiting: none    Cardiopulmonary/Hydration status: stable euvolemic    Complications: no apparent anesthesia related complications    Postop vital signs: stable    Dental Exam: Unchanged from Preop    Patient to be discharged from PACU when criteria met.

## 2023-10-15 NOTE — PROGRESS NOTES
10/14/23 1908   Advance Directives for Healthcare   Does the patient have:   Other (Comment)  (Not sure if patient is decisional.)

## 2023-10-15 NOTE — PHYSICAL THERAPY NOTE
Orders received, chart reviewed. Per RN, patient is currently on CBI and will go to cysto today. Will hold off PT per RN and will continue to follow-up.

## 2023-10-16 VITALS
SYSTOLIC BLOOD PRESSURE: 110 MMHG | TEMPERATURE: 97 F | HEART RATE: 83 BPM | DIASTOLIC BLOOD PRESSURE: 40 MMHG | HEIGHT: 72 IN | WEIGHT: 145 LBS | RESPIRATION RATE: 18 BRPM | BODY MASS INDEX: 19.64 KG/M2 | OXYGEN SATURATION: 100 %

## 2023-10-16 LAB
ANION GAP SERPL CALC-SCNC: 4 MMOL/L (ref 0–18)
ATRIAL RATE: 81 BPM
ATRIAL RATE: 89 BPM
BASOPHILS # BLD AUTO: 0.01 X10(3) UL (ref 0–0.2)
BASOPHILS NFR BLD AUTO: 0.1 %
BUN BLD-MCNC: 23 MG/DL (ref 7–18)
CALCIUM BLD-MCNC: 7.5 MG/DL (ref 8.5–10.1)
CHLORIDE SERPL-SCNC: 124 MMOL/L (ref 98–112)
CO2 SERPL-SCNC: 19 MMOL/L (ref 21–32)
CREAT BLD-MCNC: 1.25 MG/DL
EGFRCR SERPLBLD CKD-EPI 2021: 55 ML/MIN/1.73M2 (ref 60–?)
EOSINOPHIL # BLD AUTO: 0 X10(3) UL (ref 0–0.7)
EOSINOPHIL NFR BLD AUTO: 0 %
ERYTHROCYTE [DISTWIDTH] IN BLOOD BY AUTOMATED COUNT: 14.7 %
GLUCOSE BLD-MCNC: 189 MG/DL (ref 70–99)
HCT VFR BLD AUTO: 26.2 %
HGB BLD-MCNC: 8.2 G/DL
IMM GRANULOCYTES # BLD AUTO: 0.07 X10(3) UL (ref 0–1)
IMM GRANULOCYTES NFR BLD: 0.9 %
LYMPHOCYTES # BLD AUTO: 0.99 X10(3) UL (ref 1–4)
LYMPHOCYTES NFR BLD AUTO: 12.1 %
MCH RBC QN AUTO: 31.9 PG (ref 26–34)
MCHC RBC AUTO-ENTMCNC: 31.3 G/DL (ref 31–37)
MCV RBC AUTO: 101.9 FL
MONOCYTES # BLD AUTO: 0.37 X10(3) UL (ref 0.1–1)
MONOCYTES NFR BLD AUTO: 4.5 %
NEUTROPHILS # BLD AUTO: 6.71 X10 (3) UL (ref 1.5–7.7)
NEUTROPHILS # BLD AUTO: 6.71 X10(3) UL (ref 1.5–7.7)
NEUTROPHILS NFR BLD AUTO: 82.4 %
OSMOLALITY SERPL CALC.SUM OF ELEC: 313 MOSM/KG (ref 275–295)
P AXIS: 11 DEGREES
P AXIS: 66 DEGREES
P-R INTERVAL: 194 MS
P-R INTERVAL: 198 MS
PLATELET # BLD AUTO: 185 10(3)UL (ref 150–450)
POTASSIUM SERPL-SCNC: 4.3 MMOL/L (ref 3.5–5.1)
Q-T INTERVAL: 440 MS
Q-T INTERVAL: 444 MS
QRS DURATION: 142 MS
QRS DURATION: 146 MS
QTC CALCULATION (BEZET): 511 MS
QTC CALCULATION (BEZET): 540 MS
R AXIS: -61 DEGREES
R AXIS: -67 DEGREES
RBC # BLD AUTO: 2.57 X10(6)UL
SODIUM SERPL-SCNC: 147 MMOL/L (ref 136–145)
T AXIS: -2 DEGREES
T AXIS: 26 DEGREES
VENTRICULAR RATE: 81 BPM
VENTRICULAR RATE: 89 BPM
WBC # BLD AUTO: 8.2 X10(3) UL (ref 4–11)

## 2023-10-16 PROCEDURE — 99239 HOSP IP/OBS DSCHRG MGMT >30: CPT | Performed by: HOSPITALIST

## 2023-10-16 RX ORDER — FINASTERIDE 5 MG/1
5 TABLET, FILM COATED ORAL DAILY
Status: DISCONTINUED | OUTPATIENT
Start: 2023-10-16 | End: 2023-10-16

## 2023-10-16 RX ORDER — FINASTERIDE 5 MG/1
5 TABLET, FILM COATED ORAL DAILY
Qty: 30 TABLET | Refills: 5 | Status: SHIPPED | OUTPATIENT
Start: 2023-10-16

## 2023-10-16 RX ORDER — CEFUROXIME AXETIL 500 MG/1
500 TABLET ORAL 2 TIMES DAILY
Qty: 16 TABLET | Refills: 0 | Status: SHIPPED | OUTPATIENT
Start: 2023-10-16 | End: 2023-10-24

## 2023-10-16 NOTE — SLP NOTE
ADULT SWALLOWING EVALUATION    ASSESSMENT    ASSESSMENT/OVERALL IMPRESSION:  Patient is a 81 yo male admitted with hematuria and PMHx significant for HTN, BPH, and TURP. SLP order received to evaluate oropharyngeal swallow d/t concern for aspiration. Patient received alert and oriented in bed. He reported chronic cough for the past 3-4 months, but does not feel this is related to PO intake. He denied choking sensation with PO intake, as well as, globus sensation and odynophagia. Patient reported consuming a regular diet and thin liquids and denied history of any SLP services. Patient presented with a largely intact oropharyngeal swallow. Bolus acceptance was adequate without evidence of anterior bolus loss. Mastication and AP bolus transit were thorough and efficient without evidence of oral residue. Pharyngeal swallow initiation appeared timely and hyolaryngeal excursion was adequate per palpation. No overt s/s of aspiration observed during PO trials. However, patient with post-prandial cough well after PO trials had discontinued. Recommend patient continue a regular diet and thin liquids with general safe swallowing precautions. VFSS recommended to r/o aspiration and education provided re: exam. However, patient politely declined at this time. SLP will follow up to monitor diet tolerance and confirm whether or not patient wishes to complete VFSS. RECOMMENDATIONS   Diet Recommendations - Solids: Regular  Diet Recommendations - Liquids:  Thin Liquids                        Compensatory Strategies Recommended: Small bites and sips  Aspiration Precautions: Upright position  Medication Administration Recommendations: One pill at a time  Treatment Plan/Recommendations: Videofluoroscopic swallow study (pt politely refused at this time)       HISTORY   MEDICAL HISTORY  Reason for Referral: R/O aspiration    Problem List  Principal Problem:    Orthostatic hypotension  Active Problems:    Gross hematuria Thrombocytopenia ([facility] [address]    Acute kidney injury ([facility] [address]    Metabolic acidosis    Hypotension    Hematuria    Pneumonia of right lung due to infectious organism    Gastrointestinal hemorrhage, unspecified gastrointestinal hemorrhage type    Community acquired pneumonia of right lower lobe of lung      Past Medical History  Past Medical History:   Diagnosis Date    Essential hypertension     Pat catheter in place     H/O transurethral resection of prostate     Neuropathy     Urinary retention           Diet Prior to Admission: Regular; Thin liquids       Patient/Family Goals: none stated    SWALLOWING HISTORY  Current Diet Consistency: Regular; Thin liquids  Dysphagia History: as above  Imaging Results:   CT Chest 10/15/23  CONCLUSION:       1. Moderate to large right and trace left pleural effusions. 2.  There is consolidation within the left lower lobe, right lower lobe, and posterior aspect of the right upper lobe which may represent atelectasis and/or pneumonia. 3.  Cholelithiasis. LOCATION:  [facility]         Dictated by (CST): Karyn Killian MD on 10/16/2023 at 1:10 AM       Finalized by (CST): Karyn Killian MD on 10/16/2023 at 1:18 AM     SUBJECTIVE       OBJECTIVE   ORAL MOTOR EXAMINATION  Dentition: Functional  Symmetry: Within Functional Limits  Strength: Within Functional Limits     Range of Motion: Within Functional Limits       Voice Quality: Clear  Respiratory Status: Unlabored  Consistencies Trialed: Thin liquids;Puree;Hard solid  Method of Presentation: Self presentation  Patient Positioning: Upright;Midline    Oral Phase of Swallow: Within Functional Limits                      Pharyngeal Phase of Swallow: Within Functional Limits           (Please note: Silent aspiration cannot be evaluated clinically.  Videofluoroscopic Swallow Study is required to rule-out silent aspiration.)    Esophageal Phase of Swallow: No complaints consistent with possible esophageal involvement  Comments: none stated              GOALS  Goal #1 The patient will tolerate regular consistency and thin liquids without overt signs or symptoms of aspiration with 95 % accuracy over 1-2 session(s).   In Progress   Goal #2 VFSS    Not Addressed     FOLLOW UP  Treatment Plan/Recommendations: Videofluoroscopic swallow study (pt politely refused at this time)     Follow Up Needed (Documentation Required): Yes  SLP Follow-up Date: 10/17/23    Thank you for your referral.   If you have any questions, please contact Gary White, SLP

## 2023-10-16 NOTE — PHYSICAL THERAPY NOTE
PHYSICAL THERAPY EVALUATION - INPATIENT     Room Number: 430/430-A  Evaluation Date: 10/16/2023  Type of Evaluation: Initial  Physician Order: PT Eval and Treat    Presenting Problem: Fall, recurrent hematuria; s/p cystoscopy, clot evacuation, fulguration of bleeding for gross hematuria with clot retention 10/15  Co-Morbidities : HTN, BPH, chronic luis  Reason for Therapy: Mobility Dysfunction and Discharge Planning    History related to current admission: Patient is a 80year old male admitted on 10/14/2023 from [facility] for fall, recurrent hematuria. Pt diagnosed with gross hematuria, clot retention, s/p cystoscopy, clot evacuation, fulguration of bleeding. Previous Admissions:   10/11-10/13/2023: Gross hematuria; rec home      ASSESSMENT   In this PT evaluation, the patient presents with the following impairments decreased strength, functional mobility, trunk control, endurance, balance. These impairments and comorbidities manifest themselves as functional limitations in independent bed mobility, transfers, and gait. The patient is below baseline and would benefit from skilled inpatient PT to address the above deficits to assist patient in returning to prior to level of function. Functional outcome measures completed include [facility]. The -PAC '6-Clicks' Inpatient Basic Mobility Short Form was completed and this patient is demonstrating a Approx Degree of Impairment: 41.77%  degree of impairment in mobility. Research supports that patients with this level of impairment may benefit from [address] DISCHARGE RECOMMENDATIONS  PT Discharge Recommendations: Home with home health PT;24 hour care/supervision    PLAN  PT Treatment Plan: Bed mobility; Body mechanics; Endurance; Energy conservation;Patient education; Family education;Gait training;Balance training;Transfer training;Strengthening  Rehab Potential : Fair  Frequency (Obs):  (2-3x/week)  Number of Visits to Meet Established Goals: 4      CURRENT GOALS    Goal #1 Patient is able to demonstrate supine - sit EOB @ level: supervision     Goal #2 Patient is able to demonstrate transfers EOB to/from Chair/Wheelchair at assistance level: supervision     Goal #3 Patient is able to ambulate 150 feet with assist device: walker - rolling at assistance level: supervision     Goal #4    Goal #5    Goal #6    Goal Comments: Goals established on 10/16/2023    HOME SITUATION  Type of Home: Assisted living facility [facility])   Home Layout: One level                Lives With: Spouse;Staff 24 hours     Patient Owned Equipment: Rollator       Prior Level of [place]: Pt reports he is typically mod I with rollator. Is supposed to start getting assistance with showers soon. Son is a physician. SUBJECTIVE  \"No, those are yesterday's newspapers and NYT, I need today's\"      OBJECTIVE  Precautions: Bed/chair alarm  Fall Risk: High fall risk    WEIGHT BEARING RESTRICTION  Weight Bearing Restriction: None                PAIN ASSESSMENT  Ratin          COGNITION  Safety Judgement:  decreased awareness of need for assistance and decreased awareness of need for safety  Awareness of Errors:  decreased awareness of errors   Awareness of Deficits:  decreased awareness of deficits    RANGE OF MOTION AND STRENGTH ASSESSMENT  See OT note for UE assessment    Lower extremity ROM is within functional limits     Lower extremity strength is grossly 3+/5      BALANCE  Static Sitting: Fair +  Dynamic Sitting: Fair -  Static Standing: Poor +  Dynamic Standing: Poor +    ADDITIONAL TESTS                                    ACTIVITY TOLERANCE                         O2 WALK       NEUROLOGICAL FINDINGS                        AM-PAC '6-Clicks' INPATIENT SHORT FORM - BASIC MOBILITY  How much difficulty does the patient currently have. ..   Patient Difficulty: Turning over in bed (including adjusting bedclothes, sheets and blankets)?: None   Patient Difficulty: Sitting down on and standing up from a chair with arms (e.g., wheelchair, bedside commode, etc.): A Little   Patient Difficulty: Moving from lying on back to sitting on the side of the bed?: A Little   How much help from another person does the patient currently need. .. Help from Another: Moving to and from a bed to a chair (including a wheelchair)?: A Little   Help from Another: Need to walk in hospital room?: A Little   Help from Another: Climbing 3-5 steps with a railing?: A Little       AM-PAC Score:  Raw Score: 19   Approx Degree of Impairment: 41.77%   Standardized Score (AM-PAC Scale): 45.44   CMS Modifier (G-Code): CK    FUNCTIONAL ABILITY STATUS  Gait Assessment   Functional Mobility/Gait Assessment  Gait Assistance: Contact guard assist;Minimum assistance  Distance (ft): 125  Assistive Device: Rolling walker  Pattern:  (incr knee flexion, genu valgum bilaterally, B pronated feet)    Skilled Therapy Provided       Gait Training:   Pt cued on upright standing posture to improve alignment with BUEs, pt notably crouched with increased knee flexion and genuvalgum bilaterally as well as bilat pronation contributing to poor gait mechanics - occasional req Cindy for this and RW mgmt  Pt cued on gait sequencing with RW - pt instructed to maintain close positioning to RW - tends to place too far laterally  Pt cued on proper UE placement on RW handles    Therapeutic Activity:   Pt cued on placement of UE and LEs for optimal force generation and safe STS transfer. Pt cued on usage of arm rests for controlled descent into sitting      Bed Mobility:  Rolling: NT  Supine to sit: supervision   Sit to supine: NT     Transfer Mobility:  Sit to stand: CGA   Stand to sit: CGA  Gait = CGA    Therapist's Comments: RN cleared for session. Pt agreeable for therapy, received supine. Instructed to call for nursing staff for any needs and OOB mobility.      Exercise/Education Provided:  Bed mobility  Body mechanics  Energy conservation  Gait training  Posture  Strengthening  Transfer training    Patient End of Session: Up in chair;Needs met;Call light within reach;RN aware of session/findings; All patient questions and concerns addressed; Alarm set      Patient Evaluation Complexity Level:  History Moderate - 1 or 2 personal factors and/or co-morbidities   Examination of body systems Low - addressing 1-2 elements   Clinical Presentation Low - Stable   Clinical Decision Making Low - Stable       PT Session Time: 25 minutes  Gait Training: 10 minutes  Therapeutic Activity: 4 minutes

## 2023-10-16 NOTE — BRIEF OP NOTE
Pre-Operative Diagnosis: Hematuria [R31.9]  Urinary retention [R33.9]     Post-Operative Diagnosis: Hematuria [R31. 9]Urinary retention [R33.9]      Procedure Performed:   CYSTOSCOPY , CLOT EVACUATION , FULGURATION OF BLEEDING    Surgeon(s) and Role:     Addie Hewitt MD - Primary    Assistant(s):        Surgical Findings: bleeding from the prostate     Specimen: none     Estimated Blood Loss: Blood Output: 10 mL (10/15/2023  6:42 PM)      Aleah Regan MD  10/15/2023  7:26 PM

## 2023-10-16 NOTE — OPERATIVE REPORT
[facility]    PATIENT'S NAME: Stanislav Huff   ATTENDING PHYSICIAN: Montse Spears M.D. OPERATING PHYSICIAN: Helene Harrington M.D. PATIENT ACCOUNT#:   [de-identified]    LOCATION:  PACU [facility] PACU 2 EDW 10  MEDICAL RECORD #:   [id number]       YOB: 1933  ADMISSION DATE:       10/14/2023      OPERATION DATE:  10/15/2023    OPERATIVE REPORT      PREOPERATIVE DIAGNOSIS:  Gross hematuria with clot retention. POSTOPERATIVE DIAGNOSIS:  Gross hematuria with clot retention. PROCEDURE:  Cystoscopy, clot evacuation, fulguration of bleeding (prostatic). ANESTHESIA:  MAC sedation. ESTIMATED BLOOD LOSS:  10 mL. COMPLICATIONS:  None. CONDITION:  Good. DRAINS:  A 20-Khmer 3-way Pat catheter. SPECIMENS:  None. INDICATIONS:  The patient is a 80-year-old male who presents with gross hematuria with clot retention for evaluation and intervention. OPERATIVE TECHNIQUE:  The patient was prepped and draped in sterile fashion, being placed in a dorsal lithotomy position after undergoing successful administration of general anesthesia while supine. A 2% anesthetic lubricant was placed into the urethra. A 21-Khmer cystoscope was advanced through the urethra into the prostatic fossa noting some active bleeding present on the lateral lobes, especially at the bladder neck region. On advancement into the bladder, the bladder mucosa was evaluated and was normal.  There was moderate to severe trabeculation and findings compatible with a decompensated bladder. Clots were then evacuated. Further evaluation of the bladder mucosa confirmed above findings. Bleeding was seen actively in the prostatic fossa, more at the bladder neck region. The Bugbee electrode was then used, and using the cautery current, areas of active bleeding were then cauterized thoroughly.   With no further active bleeding seen, the cystoscope then withdrawn and a 20-Khmer 3-way Pat catheter was placed and started on continuous bladder irrigation with sterile water. The patient tolerated the procedure well and then was transferred to the recovery room in good condition.     Dictated By Saskia Galeana M.D.  d: 10/15/2023 19:31:31  t: 10/15/2023 19:51:24  [facility] [id number]  [place]/

## 2023-10-16 NOTE — PLAN OF CARE
Pt received A&Ox3-4, forgetful. Afebrile. VSS. Denies pain. CBI running moderate to fast, luis bag w/ peach colored output. Plan for cysto this evening. Consent obtained from pt's son Gracie Nathan and placed in chart. Updated on POC. CT chest pending per pulm. IVF infusing @ 100ml/hr. IV abx per MAR. Fall and isolation precautions in place. Call light in reach.

## 2023-10-16 NOTE — CM/SW NOTE
JASMIN noted that patient admitted from [address] [place] [address] where he lives with his wife. Patient is cleared for DC [facility]. JASMIN met with patient at bedside and he reported that he gets therapy services through [address] [place] [address]. JASMIN called DON at [facility], [address] [phone] , and left her a voicemail notifying that patient is ready for DC. JASMIN called patient's son Kath Mansfield and confirmed that patient is current with [facility]. He would like these services re-instated. JASMIN sent updated clinical information and orders to [facility] and updated AVS.       At DC, RN please call report to:   Rosi Cisneros [phone]      Update: JASMIN received a call back from Karime Zamudio at 3:40pm stating that patient is OKAY to return today and to have RN call report to [place]. JASMIN will continue to follow for plan of care changes and remain available for any additional DC needs or concerns.      Qasim CRESPO, [facility]  Discharge Planner   [id number]

## 2023-10-17 NOTE — DISCHARGE SUMMARY
[facility] HOSPITALIST  DISCHARGE SUMMARY     Henri Sánchez Patient Status:  Inpatient    3/22/1933 MRN [id number]   [facility] 4NW-A Attending No att. providers found   Hosp Day # 2 PCP Kaur Barrett DO     Date of Admission: 10/14/2023  Date of Discharge:  10/16/2023     Discharge Disposition: [facility]    Discharge Diagnosis:  Hematuria  Acute on chronic anemia  Pneumonia  Acute renal insufficiency and chronic kidney disease stage III      History of Present Illness:   Henri Sánchez is a 80year old male with medical history of essential hypertension, BPH, TURP and chronic indwelling luis catheter who presents to the ER with complaints of recurrent hematuria. He was discharged yesterday from the hospital with similar symptoms. He had CBI which cleared up his hematuria and he was discharged with plans for OP cystoscopy. He denies abdominal pain, headache, fever or chills. He does reports a cough which has been present for some time now. He reports it is non productive and denies shortness of breath. In the ER he was restarted on CBI and started on antibiotics for a consolidation noted on CXR    Brief Synopsis: Patient is a 72-year-old man admitted with hematuria. He was started on CBI. His hemoglobin was closely monitored. Urology was consulted. He underwent a cystoscopy which showed bleeding from his prostate. He did not require any transfusions during his hospitalization. He was noted on chest x-ray of a masslike consolidation in the right infrahilar and medial right lung. He had a CT which confirmed the same. He was placed on empiric antibiotics. He was recommended a thoracentesis due to his pleural effusions which she declined. He was discharged home in stable condition    Lace+ Score: 63  59-90 High Risk  29-58 Medium Risk  0-28   Low Risk       TCM Follow-Up Recommendation:  LACE > 58:  High Risk of readmission after discharge from the hospitals.      Procedures during hospitalization:    Cystoscopy, clot evacuation, fulguration of bleeding (prostatic). Consultants:  Urology  Pulmonary    Discharge Medication List:     Discharge Medications        START taking these medications        Instructions Prescription details   cefuroxime 500 MG Tabs  Commonly known as: Ceftin      Take 1 tablet (500 mg total) by mouth 2 (two) times daily for 8 days. Stop taking on: October 24, 2023  Quantity: 16 tablet  Refills: 0     finasteride 5 MG Tabs  Commonly known as: Proscar      Take 1 tablet (5 mg total) by mouth daily. Quantity: 30 tablet  Refills: 5            CONTINUE taking these medications        Instructions Prescription details   cyanocobalamin 500 MCG Tabs  Commonly known as: Vitamin B12      Take 1 tablet (500 mcg total) by mouth daily. Refills: 0     fluticasone propionate 50 MCG/ACT Susp  Commonly known as: Flonase      1 spray by Each Nare route daily. Refills: 0     Multi-Day Tabs      Take by mouth.    Refills: 0               Where to Get Your Medications        These medications were sent to [facility], [address] [place] [address], [phone]  [address] [place] [address]      Phone: [phone]   cefuroxime 500 MG Tabs  finasteride 5 MG Tabs         [place]: n[place]    Follow-up appointment:   Elbert Rincon DO  [address] [place] [address] [place] [address] [phone]    Schedule an appointment as soon as possible for a visit in 1 month(s)  urology followup    [facility]  [address] [place] [address]  [phone]  Schedule an appointment as soon as possible for a visit  if pt decides he wishes to have a thoracentesis done    Appointments for Next 30 Days 10/17/2023 - 11/16/2023      None            Vital signs:       Physical Exam:    General: No acute distress   Lungs: clear to auscultation  Cardiovascular: S1, S2  Abdomen: Soft      -----------------------------------------------------------------------------------------------  PATIENT DISCHARGE INSTRUCTIONS: See electronic chart    Reginald Wooten MD    Total time spent on discharge plannin minutes     The [facility] makes medical notes like these available to patients in the interest of transparency. Please be advised this is a medical document. Medical documents are intended to carry relevant information, facts as evident, and the clinical opinion of the practitioner. The medical note is intended as peer to peer communication and may appear blunt or direct. It is written in medical language and may contain abbreviations or verbiage that are unfamiliar.

## 2023-10-17 NOTE — PAYOR COMM NOTE
--------------  DISCHARGE REVIEW    Payor: [employer] MEDICARE ADV PPO  Subscriber #:  [id number]  Authorization Number: [id number]    Admit date: 10/14/23  Admit time:   2:11 PM  Discharge Date: 10/16/2023  5:42 PM     Admitting Physician: Jesica Carrera MD  Attending Physician:  No att. providers found  Primary Care Physician: Salvador Vargas DO    REVIEWER COMMENTS  PLEASE FAX DETERMINATION OF DAYS -[phone]

## 2024-03-22 ENCOUNTER — HOSPITAL ENCOUNTER (OUTPATIENT)
Dept: GENERAL RADIOLOGY | Facility: HOSPITAL | Age: 89
Discharge: HOME | End: 2024-03-22
Attending: STUDENT IN AN ORGANIZED HEALTH CARE EDUCATION/TRAINING PROGRAM
Payer: MEDICARE

## 2024-03-22 DIAGNOSIS — J90 PLEURAL EFFUSION: ICD-10-CM

## 2024-03-22 PROCEDURE — 71046 X-RAY EXAM CHEST 2 VIEWS: CPT | Performed by: STUDENT IN AN ORGANIZED HEALTH CARE EDUCATION/TRAINING PROGRAM

## 2024-03-23 ENCOUNTER — APPOINTMENT (OUTPATIENT)
Dept: CT IMAGING | Facility: HOSPITAL | Age: 89
End: 2024-03-23
Attending: EMERGENCY MEDICINE
Payer: MEDICARE

## 2024-03-23 ENCOUNTER — APPOINTMENT (OUTPATIENT)
Dept: GENERAL RADIOLOGY | Facility: HOSPITAL | Age: 89
End: 2024-03-23
Attending: EMERGENCY MEDICINE
Payer: MEDICARE

## 2024-03-23 ENCOUNTER — HOSPITAL ENCOUNTER (INPATIENT)
Facility: HOSPITAL | Age: 89
LOS: 5 days | Discharge: NURSING, REHAB OR LONG-TERM CARE | End: 2024-03-28
Attending: EMERGENCY MEDICINE | Admitting: INTERNAL MEDICINE
Payer: MEDICARE

## 2024-03-23 DIAGNOSIS — S06.5XAA ACUTE SUBDURAL HEMATOMA (HCC): ICD-10-CM

## 2024-03-23 DIAGNOSIS — N39.0 URINARY TRACT INFECTION WITHOUT HEMATURIA, SITE UNSPECIFIED: ICD-10-CM

## 2024-03-23 DIAGNOSIS — J90 PLEURAL EFFUSION: ICD-10-CM

## 2024-03-23 DIAGNOSIS — N17.9 AKI (ACUTE KIDNEY INJURY) (HCC): ICD-10-CM

## 2024-03-23 DIAGNOSIS — R41.82 ALTERED MENTAL STATUS, UNSPECIFIED ALTERED MENTAL STATUS TYPE: Primary | ICD-10-CM

## 2024-03-23 LAB
ALBUMIN SERPL-MCNC: 2.7 G/DL (ref 3.4–5)
ALBUMIN/GLOB SERPL: 0.7 {RATIO} (ref 1–2)
ALP LIVER SERPL-CCNC: 85 U/L
ALT SERPL-CCNC: 17 U/L
ANION GAP SERPL CALC-SCNC: 5 MMOL/L (ref 0–18)
AST SERPL-CCNC: 24 U/L (ref 15–37)
ATRIAL RATE: 81 BPM
BASOPHILS # BLD AUTO: 0.04 X10(3) UL (ref 0–0.2)
BASOPHILS NFR BLD AUTO: 0.4 %
BILIRUB SERPL-MCNC: 0.5 MG/DL (ref 0.1–2)
BILIRUB UR QL STRIP.AUTO: NEGATIVE
BUN BLD-MCNC: 30 MG/DL (ref 9–23)
CALCIUM BLD-MCNC: 8.5 MG/DL (ref 8.5–10.1)
CHLORIDE SERPL-SCNC: 116 MMOL/L (ref 98–112)
CK SERPL-CCNC: 53 U/L
CO2 SERPL-SCNC: 27 MMOL/L (ref 21–32)
CREAT BLD-MCNC: 1.44 MG/DL
EGFRCR SERPLBLD CKD-EPI 2021: 46 ML/MIN/1.73M2 (ref 60–?)
EOSINOPHIL # BLD AUTO: 0.17 X10(3) UL (ref 0–0.7)
EOSINOPHIL NFR BLD AUTO: 1.5 %
ERYTHROCYTE [DISTWIDTH] IN BLOOD BY AUTOMATED COUNT: 20.2 %
FLUAV + FLUBV RNA SPEC NAA+PROBE: NEGATIVE
FLUAV + FLUBV RNA SPEC NAA+PROBE: NEGATIVE
GLOBULIN PLAS-MCNC: 4.1 G/DL (ref 2.8–4.4)
GLUCOSE BLD-MCNC: 93 MG/DL (ref 70–99)
GLUCOSE BLD-MCNC: 96 MG/DL (ref 70–99)
GLUCOSE UR STRIP.AUTO-MCNC: NORMAL MG/DL
HCT VFR BLD AUTO: 31.7 %
HGB BLD-MCNC: 9.2 G/DL
IMM GRANULOCYTES # BLD AUTO: 0.05 X10(3) UL (ref 0–1)
IMM GRANULOCYTES NFR BLD: 0.5 %
INR BLD: 1.2 (ref 0.8–1.2)
KETONES UR STRIP.AUTO-MCNC: NEGATIVE MG/DL
LACTATE SERPL-SCNC: 1.2 MMOL/L (ref 0.4–2)
LEUKOCYTE ESTERASE UR QL STRIP.AUTO: 500
LYMPHOCYTES # BLD AUTO: 2.28 X10(3) UL (ref 1–4)
LYMPHOCYTES NFR BLD AUTO: 20.7 %
MCH RBC QN AUTO: 22.6 PG (ref 26–34)
MCHC RBC AUTO-ENTMCNC: 29 G/DL (ref 31–37)
MCV RBC AUTO: 77.9 FL
MONOCYTES # BLD AUTO: 0.69 X10(3) UL (ref 0.1–1)
MONOCYTES NFR BLD AUTO: 6.3 %
NEUTROPHILS # BLD AUTO: 7.79 X10 (3) UL (ref 1.5–7.7)
NEUTROPHILS # BLD AUTO: 7.79 X10(3) UL (ref 1.5–7.7)
NEUTROPHILS NFR BLD AUTO: 70.6 %
OSMOLALITY SERPL CALC.SUM OF ELEC: 312 MOSM/KG (ref 275–295)
P AXIS: 40 DEGREES
P-R INTERVAL: 216 MS
PH UR STRIP.AUTO: 6.5 [PH] (ref 5–8)
PLATELET # BLD AUTO: 260 10(3)UL (ref 150–450)
POTASSIUM SERPL-SCNC: 3.5 MMOL/L (ref 3.5–5.1)
PROT SERPL-MCNC: 6.8 G/DL (ref 6.4–8.2)
PROT UR STRIP.AUTO-MCNC: 100 MG/DL
PROTHROMBIN TIME: 15.3 SECONDS (ref 11.6–14.8)
Q-T INTERVAL: 456 MS
QRS DURATION: 152 MS
QTC CALCULATION (BEZET): 529 MS
R AXIS: -64 DEGREES
RBC # BLD AUTO: 4.07 X10(6)UL
RBC #/AREA URNS AUTO: >10 /HPF
RSV RNA SPEC NAA+PROBE: NEGATIVE
SARS-COV-2 RNA RESP QL NAA+PROBE: NOT DETECTED
SODIUM SERPL-SCNC: 148 MMOL/L (ref 136–145)
SP GR UR STRIP.AUTO: 1.01 (ref 1–1.03)
T AXIS: 17 DEGREES
TROPONIN I SERPL HS-MCNC: 14 NG/L
UROBILINOGEN UR STRIP.AUTO-MCNC: NORMAL MG/DL
VENTRICULAR RATE: 81 BPM
WBC # BLD AUTO: 11 X10(3) UL (ref 4–11)
WBC #/AREA URNS AUTO: >50 /HPF

## 2024-03-23 PROCEDURE — 72125 CT NECK SPINE W/O DYE: CPT | Performed by: EMERGENCY MEDICINE

## 2024-03-23 PROCEDURE — 99221 1ST HOSP IP/OBS SF/LOW 40: CPT | Performed by: NEUROLOGICAL SURGERY

## 2024-03-23 PROCEDURE — 71045 X-RAY EXAM CHEST 1 VIEW: CPT | Performed by: EMERGENCY MEDICINE

## 2024-03-23 PROCEDURE — 99223 1ST HOSP IP/OBS HIGH 75: CPT | Performed by: STUDENT IN AN ORGANIZED HEALTH CARE EDUCATION/TRAINING PROGRAM

## 2024-03-23 PROCEDURE — 70450 CT HEAD/BRAIN W/O DYE: CPT | Performed by: EMERGENCY MEDICINE

## 2024-03-23 RX ORDER — ANTIOX #8/OM3/DHA/EPA/LUT/ZEAX 250-2.5 MG
1 CAPSULE ORAL 2 TIMES DAILY WITH MEALS
COMMUNITY
Start: 2024-01-11

## 2024-03-23 RX ORDER — BISACODYL 10 MG
10 SUPPOSITORY, RECTAL RECTAL
Status: DISCONTINUED | OUTPATIENT
Start: 2024-03-23 | End: 2024-03-28

## 2024-03-23 RX ORDER — ECHINACEA PURPUREA EXTRACT 125 MG
1 TABLET ORAL
Status: DISCONTINUED | OUTPATIENT
Start: 2024-03-23 | End: 2024-03-28

## 2024-03-23 RX ORDER — GUAIFENESIN 600 MG/1
600 TABLET, EXTENDED RELEASE ORAL 2 TIMES DAILY PRN
Status: DISCONTINUED | OUTPATIENT
Start: 2024-03-23 | End: 2024-03-28

## 2024-03-23 RX ORDER — FUROSEMIDE 40 MG/1
40 TABLET ORAL
Status: DISCONTINUED | OUTPATIENT
Start: 2024-03-23 | End: 2024-03-27

## 2024-03-23 RX ORDER — ESCITALOPRAM OXALATE 5 MG/1
5 TABLET ORAL DAILY
COMMUNITY
Start: 2024-03-03

## 2024-03-23 RX ORDER — METOCLOPRAMIDE HYDROCHLORIDE 5 MG/ML
5 INJECTION INTRAMUSCULAR; INTRAVENOUS EVERY 8 HOURS PRN
Status: DISCONTINUED | OUTPATIENT
Start: 2024-03-23 | End: 2024-03-26

## 2024-03-23 RX ORDER — ENEMA 19; 7 G/133ML; G/133ML
1 ENEMA RECTAL ONCE AS NEEDED
Status: DISCONTINUED | OUTPATIENT
Start: 2024-03-23 | End: 2024-03-28

## 2024-03-23 RX ORDER — FUROSEMIDE 40 MG/1
40 TABLET ORAL 2 TIMES DAILY
COMMUNITY
Start: 2024-03-11

## 2024-03-23 RX ORDER — ESCITALOPRAM OXALATE 5 MG/1
5 TABLET ORAL DAILY
Status: DISCONTINUED | OUTPATIENT
Start: 2024-03-23 | End: 2024-03-28

## 2024-03-23 RX ORDER — POLYETHYLENE GLYCOL 3350 17 G/17G
17 POWDER, FOR SOLUTION ORAL DAILY PRN
Status: DISCONTINUED | OUTPATIENT
Start: 2024-03-23 | End: 2024-03-28

## 2024-03-23 RX ORDER — DOCUSATE SODIUM 100 MG/1
100 CAPSULE, LIQUID FILLED ORAL 2 TIMES DAILY
COMMUNITY
End: 2024-03-28

## 2024-03-23 RX ORDER — ACETAMINOPHEN 500 MG
500 TABLET ORAL EVERY 4 HOURS PRN
Status: DISCONTINUED | OUTPATIENT
Start: 2024-03-23 | End: 2024-03-28

## 2024-03-23 RX ORDER — FINASTERIDE 5 MG/1
5 TABLET, FILM COATED ORAL DAILY
Status: DISCONTINUED | OUTPATIENT
Start: 2024-03-23 | End: 2024-03-28

## 2024-03-23 RX ORDER — POTASSIUM CHLORIDE 1.5 G/1.58G
20 POWDER, FOR SOLUTION ORAL DAILY
Status: ON HOLD | COMMUNITY
Start: 2024-03-11 | End: 2024-03-26

## 2024-03-23 RX ORDER — ONDANSETRON 2 MG/ML
4 INJECTION INTRAMUSCULAR; INTRAVENOUS EVERY 6 HOURS PRN
Status: DISCONTINUED | OUTPATIENT
Start: 2024-03-23 | End: 2024-03-28

## 2024-03-23 RX ORDER — PANTOPRAZOLE SODIUM 20 MG/1
20 TABLET, DELAYED RELEASE ORAL
Status: DISCONTINUED | OUTPATIENT
Start: 2024-03-24 | End: 2024-03-28

## 2024-03-23 RX ORDER — SODIUM CHLORIDE 9 MG/ML
INJECTION, SOLUTION INTRAVENOUS CONTINUOUS
Status: DISCONTINUED | OUTPATIENT
Start: 2024-03-23 | End: 2024-03-24

## 2024-03-23 RX ORDER — SENNOSIDES 8.6 MG
17.2 TABLET ORAL NIGHTLY PRN
Status: DISCONTINUED | OUTPATIENT
Start: 2024-03-23 | End: 2024-03-28

## 2024-03-23 RX ORDER — MELATONIN
3 NIGHTLY PRN
Status: DISCONTINUED | OUTPATIENT
Start: 2024-03-23 | End: 2024-03-28

## 2024-03-23 RX ORDER — OMEPRAZOLE 20 MG/1
20 CAPSULE, DELAYED RELEASE ORAL EVERY MORNING
COMMUNITY
Start: 2024-03-03

## 2024-03-23 RX ORDER — BENZONATATE 100 MG/1
200 CAPSULE ORAL 3 TIMES DAILY PRN
Status: DISCONTINUED | OUTPATIENT
Start: 2024-03-23 | End: 2024-03-28

## 2024-03-23 NOTE — CONSULTS
[facility]   part of [facility]      Consult     David Loyola Patient Status:  Emergency    3/22/1933 MRN [id number]   Location [facility] EMERGENCY DEPARTMENT Attending Emmett Bolanos MD   Hosp Day # 0 PCP Js Espana DO     Referring Provider: ED  Reason for Consultation: TBI    Subjective:    David Loyola is a 89 year old male with fall found down at NH.  CT head shows a subtle R temporal SDH with no mass effect nor MLS.  He is awake and alert with no focal deficits on exam.     History/Other:      Past Medical History:  Past Medical History:   Diagnosis Date    Essential hypertension     Pat catheter in place     H/O transurethral resection of prostate     Hypercholesterolemia     Neuropathy     Urinary retention         Past Surgical History: History reviewed. No pertinent surgical history.    Social History:  reports that he has never smoked. He has never used smokeless tobacco. He reports that he does not currently use alcohol. He reports that he does not use drugs.    Family History: No family history on file.    Allergies: No Known Allergies    Medications:    No current facility-administered medications on file prior to encounter.     Current Outpatient Medications on File Prior to Encounter   Medication Sig Dispense Refill    escitalopram 5 MG Oral Tab Take 1 tablet (5 mg total) by mouth daily.      furosemide 40 MG Oral Tab Take 1 tablet (40 mg total) by mouth 2 (two) times daily.      omeprazole 20 MG Oral Capsule Delayed Release Take 1 capsule (20 mg total) by mouth every morning.      potassium chloride 20 MEQ Oral Powd Pack Take 20 mEq by mouth daily.      Multiple Vitamins-Minerals (PRESERVISION AREDS 2) Oral Cap Take 1 capsule by mouth 2 (two) times daily with meals.      docusate sodium 100 MG Oral Cap Take 1 capsule (100 mg total) by mouth 2 (two) times daily.      finasteride 5 MG Oral Tab Take 1 tablet (5 mg total) by mouth daily. 30 tablet 5    fluticasone  propionate 50 MCG/ACT Nasal Suspension 1 spray by Each Nare route daily.      cyanocobalamin 500 MCG Oral Tab Take 1 tablet (500 mcg total) by mouth daily.      Multiple Vitamin (MULTI-DAY) Oral Tab Take by mouth.         Review of Systems:   Review of systems was completed.  Pertinent positives and negatives noted in the HPI.    Objective:     /48   Pulse 64   Temp 97.3 °F (36.3 °C) (Temporal)   Resp 19   Wt 150 lb (68 kg)   SpO2 99%   BMI 20.34 kg/m²   Neuro:    MS; awake and alert    CN: pupils 3/2 B/L, EOMI   Motor: NIETO, no drift    General: No acute distress.  Alert ,         Respiratory: No wheezes, no rhonchi, clear to auscultation bilaterally,    Cardiovascular: S1, S2.  Abdomen: Soft, nontender, nondistended.    Extremities: No edema, no cyanosis.    Results:    Labs:  Laboratory data reviewed.      Selected labs - last 24 hours:  Endo  Lytes  Renal   Glu 96  Na 148 Ca 8.5  BUN 30   POC Gluc  93  K 3.5 PO4 -  Cr 1.44   A1c -  Cl 116 Mg -  eGFR 46   TSH -  CO2 27.0         LFT  CBC  Other   AST 24  WBC 11.0  PTT - Procal -   ALT 17  Hb 9.2  INR - CRP -   APk 85  Hct 31.7  Trop - D dim -   T radha 0.5  .0  pBNP -  BNP -  - Ferritin  -   Prot 6.8    CK  53 Lactate  1.2   Alb 2.7    LDL  - COVID  Not Detected       Imaging: Imaging data reviewed in Epic.    Assessment & Plan:    #1. TBI   -repeat CT head tomorrow a.m.   -will follow    -please call with questions    Plan of care discussed with patient and family    Clive Henry DO  3/23/2024    Supplementary Documentation:

## 2024-03-23 NOTE — ED QUICK NOTES
Rounding Completed. Assumed care of patient at this time. Received report from Zuleima JANSEN.     Plan of Care reviewed. Waiting for transport upstairs.  Elimination needs assessed.  Provided family at bedside.    Bed is locked and in lowest position. Call light within reach.

## 2024-03-23 NOTE — H&P
[facility]     David Loyola Patient Status:  Inpatient    3/22/1933 MRN [id number]   Location [facility] 7NE-A Attending Pao Bailey DO   Hosp Day # 0 PCP Js Espana DO     Chief Complaint:  fall    Subjective:    History of Present Illness:     David Loyola is a 89 year old male with past medical history of hypertension, hyperlipidemia, urinary retention with chronic Pat who presents ED for unwitnessed fall.  Patient's wife found patient on floor.  Time of fall is unknown.  Patient's family reports patient a day ago that showed pleural effusion.  He has been on diuretics which has not seemed to help.  He has also been on had chest x-ray Macrobid for UTI.  Patient's family reports he has had worsening confusion and has not seem like himself.  Family reports patient has chronic swelling in right leg that has not changed.  Patient used to be able to ambulate with walker but is now requiring maximal assistance.    History/Other:    Past Medical History:  Past Medical History:   Diagnosis Date    Essential hypertension     Pat catheter in place     H/O transurethral resection of prostate     Hypercholesterolemia     Neuropathy     Urinary retention      Past Surgical History:   History reviewed. No pertinent surgical history.   Family History:   No family history on file.  Social History:    reports that he has never smoked. He has never used smokeless tobacco. He reports that he does not currently use alcohol. He reports that he does not use drugs.     Allergies: No Known Allergies    Medications:    No current facility-administered medications on file prior to encounter.     Current Outpatient Medications on File Prior to Encounter   Medication Sig Dispense Refill    escitalopram 5 MG Oral Tab Take 1 tablet (5 mg total) by mouth daily.      furosemide 40 MG Oral Tab Take 1 tablet (40 mg total) by mouth 2 (two) times daily.      omeprazole 20 MG Oral Capsule Delayed  Release Take 1 capsule (20 mg total) by mouth every morning.      potassium chloride 20 MEQ Oral Powd Pack Take 20 mEq by mouth daily.      Multiple Vitamins-Minerals (PRESERVISION AREDS 2) Oral Cap Take 1 capsule by mouth 2 (two) times daily with meals.      finasteride 5 MG Oral Tab Take 1 tablet (5 mg total) by mouth daily. 30 tablet 5    fluticasone propionate 50 MCG/ACT Nasal Suspension 1 spray by Each Nare route daily.      cyanocobalamin 500 MCG Oral Tab Take 1 tablet (500 mcg total) by mouth daily.      docusate sodium 100 MG Oral Cap Take 1 capsule (100 mg total) by mouth 2 (two) times daily.      Multiple Vitamin (MULTI-DAY) Oral Tab Take by mouth.         Review of Systems:   A comprehensive review of systems was completed.    Pertinent positives and negatives noted in the HPI.    Objective:   Physical Exam:    /54 (BP Location: Left arm)   Pulse 71   Temp 96.8 °F (36 °C) (Axillary)   Resp 21   Wt 150 lb (68 kg)   SpO2 91%   BMI 20.34 kg/m²   General: No acute distress, Alert  Respiratory: No rhonchi, no wheezes  Cardiovascular: S1, S2. Regular rate and rhythm  Abdomen: Soft, Non-tender, non-distended, positive bowel sounds   : Pat in place.   Neuro: No new focal deficits  Extremities: Right lower extremity edema    Results:    Labs:      Labs Last 24 Hours:    Recent Labs   Lab 03/23/24  0725   RBC 4.07   HGB 9.2*   HCT 31.7*   MCV 77.9*   MCH 22.6*   MCHC 29.0*   RDW 20.2   NEPRELIM 7.79*   WBC 11.0   .0       Recent Labs   Lab 03/23/24  0725   GLU 96   BUN 30*   CREATSERUM 1.44*   EGFRCR 46*   CA 8.5   ALB 2.7*   *   K 3.5   *   CO2 27.0   ALKPHO 85   AST 24   ALT 17   BILT 0.5   TP 6.8       Lab Results   Component Value Date    INR 1.20 03/23/2024    INR 1.30 (H) 05/29/2023    INR 1.23 (H) 05/28/2023       Recent Labs   Lab 03/23/24  0725   TROPHS 14   CK 53       No results for input(s): \"TROP\", \"PBNP\" in the last 168 hours.    No results for input(s): \"PCT\" in the  last 168 hours.    Imaging: Imaging data reviewed in Epic.    Assessment & Plan:      #Mechanical fall  -CT head reviewed  -CT C-spine reviewed  -PT OT consulted    #Small extra-axial hemorrhage, subdural versus epidural  -CT head reviewed  -Plan for repeat CT head tomorrow  -Neurosurgery following    #Large right pleural effusion  -Chest x-ray reviewed  -Remains on room air  -Echo ordered  -Plan for thoracentesis Monday, possibly sooner if respiratory status declines  -Pulmonology following    #UTI  -Blood cultures in lab  -Urine culture in lab  -IV antibiotics    #Hypernatremia  #GINETTE  -Likely in the setting of dehydration  -IV fluids    #History of urinary retention  -Pat in place prior to arrival    #GERD  -PPI         Plan of care discussed with patient    Pao Bailey DO    Supplementary Documentation:     The 21st Century Cures Act makes medical notes like these available to patients in the interest of transparency. Please be advised this is a medical document. Medical documents are intended to carry relevant information, facts as evident, and the clinical opinion of the practitioner. The medical note is intended as peer to peer communication and may appear blunt or direct. It is written in medical language and may contain abbreviations or verbiage that are unfamiliar.               **Certification      PHYSICIAN Certification of Need for Inpatient Hospitalization - Initial Certification    Patient will require inpatient services that will reasonably be expected to span two midnight's based on the clinical documentation in H+P.   Based on patients current state of illness, I anticipate that, after discharge, patient will require TBD.

## 2024-03-23 NOTE — CONSULTS
Pulmonary / Critical Care H&P/Consult       NAME: David Loyola - ROOM: 7611/7611-A - MRN: [id number] - Age: 89 year old - :  3/22/1933    Date of Admission: 3/23/2024  7:08 AM  Admission Diagnosis: Pleural effusion [J90]  GINETTE (acute kidney injury) (HCC) [N17.9]  Acute subdural hematoma (HCC) [S06.5XAA]  Urinary tract infection without hematuria, site unspecified [N39.0]  Altered mental status, unspecified altered mental status type [R41.82]    Reason for consult: pleural effusion       History of Present Illness: 89 yo M with h/o R sided effusion; this was noted when hospitalized 10/2023.  Thora was offered at that time and pt declined. Saw Dr. Haney on 3/08/24. Repeat imaging ordered at that time. Pt was found on the ground by his wife which brought him to the ER. Cxr imaging with large R effusion. Also noted to have subdural hematoma on ct.      Past Medical History:   Diagnosis Date    Essential hypertension     Pat catheter in place     H/O transurethral resection of prostate     Hypercholesterolemia     Neuropathy     Urinary retention       History reviewed. No pertinent surgical history.     Allergies:  No Known Allergies    Social History:  Social History     Socioeconomic History    Marital status:      Spouse name: Not on file    Number of children: Not on file    Years of education: Not on file    Highest education level: Not on file   Occupational History    Not on file   Tobacco Use    Smoking status: Never    Smokeless tobacco: Never   Vaping Use    Vaping Use: Never used   Substance and Sexual Activity    Alcohol use: Not Currently     Comment: occassional    Drug use: Never    Sexual activity: Not on file   Other Topics Concern    Not on file   Social History Narrative    Not on file     Social Determinants of Health     Financial Resource Strain: Not on file   Food Insecurity: No Food Insecurity (3/23/2024)    Food Insecurity     Food Insecurity: Never true   Transportation Needs: No  Transportation Needs (3/23/2024)    Transportation Needs     Lack of Transportation: No   Physical Activity: Not on file   Stress: Not on file   Social Connections: Not on file   Housing Stability: Low Risk  (3/23/2024)    Housing Stability     Housing Instability: No     Housing Instability Emergency: Not on file          Family History:  No family history on file.     Home Medications:  Outpatient Medications Marked as Taking for the 3/23/24 encounter (Hospital Encounter)   Medication Sig Dispense Refill    escitalopram 5 MG Oral Tab Take 1 tablet (5 mg total) by mouth daily.      furosemide 40 MG Oral Tab Take 1 tablet (40 mg total) by mouth 2 (two) times daily.      omeprazole 20 MG Oral Capsule Delayed Release Take 1 capsule (20 mg total) by mouth every morning.      potassium chloride 20 MEQ Oral Powd Pack Take 20 mEq by mouth daily.      Multiple Vitamins-Minerals (PRESERVISION AREDS 2) Oral Cap Take 1 capsule by mouth 2 (two) times daily with meals.      docusate sodium 100 MG Oral Cap Take 1 capsule (100 mg total) by mouth 2 (two) times daily.         Scheduled Medication:   piperacillin-tazobactam  3.375 g Intravenous Q8H     Continuous Infusing Medication:   sodium chloride       PRN Medication:acetaminophen, melatonin, guaiFENesin ER, benzonatate, glycerin-hypromellose-, sodium chloride, ondansetron, metoclopramide, polyethylene glycol (PEG 3350), sennosides, bisacodyl, fleet enema     REVIEW OF SYSTEMS:   Reviewed and negative except per HPI    OBJECTIVE:  Vitals:    03/23/24 1100 03/23/24 1115 03/23/24 1146 03/23/24 1156   BP: (!) 139/99  136/54    BP Location:   Left arm    Pulse: 54 64 77    Resp: 19 19 22    Temp:   96.8 °F (36 °C)    TempSrc:   Axillary    SpO2: 98% 99% 91%    Weight:    150 lb (68 kg)     O2; ra                 Wt Readings from Last 3 Encounters:   03/23/24 150 lb (68 kg)   10/14/23 145 lb (65.8 kg)   10/12/23 143 lb (64.9 kg)       No intake or output data in the 24 hours  ending 03/23/24 1209    /54 (BP Location: Left arm)   Pulse 77   Temp 96.8 °F (36 °C) (Axillary)   Resp 22   Wt 150 lb (68 kg)   SpO2 91%   BMI 20.34 kg/m²     General Appearance:    Alert, cooperative, no distress, appears stated age   Head:    Normocephalic, without obvious abnormality, atraumatic   Eyes:    PERRL, conjunctiva/corneas clear   Neck:   Supple, symmetrical, trachea midline, no adenopathy;        thyroid:  No enlargement/tenderness/nodules; no carotid    bruit or JVD   Back:     Symmetric, no curvature, ROM normal, no CVA tenderness   Lungs:     Diminished on R    Chest wall:    No tenderness or deformity   Heart:    Regular rate and rhythm, S1 and S2 normal, no murmur, rub   or gallop   Abdomen:     Soft, non-tender, bowel sounds active all four quadrants,     no masses, no organomegaly   Extremities:   Extremities normal, atraumatic, no cyanosis or edema   Pulses:   2+ and symmetric all extremities   Skin:   Skin color, texture, turgor normal, no rashes or lesions   Lymph nodes:   Cervical, supraclavicular, and axillary nodes normal   Neurologic:   CNII-XII intact. Normal strength, sensation and reflexes       throughout       Recent Labs   Lab 03/23/24  0725   WBC 11.0   HGB 9.2*   HCT 31.7*   .0     No results for input(s): \"INR\" in the last 168 hours.    Recent Labs   Lab 03/23/24  0725   *   K 3.5   *   CO2 27.0   BUN 30*   CREATSERUM 1.44*   GLU 96   CA 8.5   AST 24   ALT 17   ALKPHO 85   BILT 0.5   ALB 2.7*   TP 6.8         Creatinine (mg/dL)   Date Value   03/23/2024 1.44 (H)   10/16/2023 1.25   10/15/2023 1.21   ]        Imaging: cxr: large R effusion, ? Loculated based on appearance     ASSESSMENT/PLAN:    R sided effusion: large, presumed to be cardiac in origin though has not yet been sampled.   - will eventually require thoracentesis for at least diagnostic sample   - effusion present since at least 10/2023  - once we are sure there is not an active neuro  issue then can plan thora on Monday. Can perform sooner if resp status declines however pt is on room air and does not appear dyspneic.   - check echo   Subdural hematoma: suggested on ct head   - seen by neurosurgery  - check INR  - repeat ct tomorrow per neurosurg     Will follow        Olivia Crandall M.D.  [facility]  Pulmonary / Critical care  3/23/2024

## 2024-03-23 NOTE — ED PROVIDER NOTES
Patient Seen in: [facility] [place] Emergency Department      History     Chief Complaint   Patient presents with    Altered Mental Status    Fall     Stated Complaint: fall, ams    Subjective:   HPI    89-year-old male brought in after an unwitnessed fall.  Found on the floor by his wife.  Unknown time of fall.  Arrives with c-collar in place.  Bruising to the left side of the head.  Patient is altered and cannot provide no useful history.  Patient's son later arrived and reports that he was evaluated here a day ago and had a chest x-ray that showed a pleural effusion.  He has also been on diuretics which do not seem to be helping with his edema.  He recently was on Macrobid for UTI.  Family reports that the increased swelling in the right leg is chronic and unchanged.  Patient's son is a physician and reports that the patient's condition has been decreasing as of late.  He was previously ambulating with a walker but is now requiring maximal assist.    Objective:   Past Medical History:   Diagnosis Date    Essential hypertension     Pat catheter in place     H/O transurethral resection of prostate     Hypercholesterolemia     Neuropathy     Urinary retention               History reviewed. No pertinent surgical history.             Social History     Socioeconomic History    Marital status:    Tobacco Use    Smoking status: Never    Smokeless tobacco: Never   Vaping Use    Vaping Use: Never used   Substance and Sexual Activity    Alcohol use: Not Currently     Comment: occassional    Drug use: Never     Social Determinants of Health     Food Insecurity: No Food Insecurity (10/14/2023)    Food Insecurity     Food Insecurity: Never true   Transportation Needs: No Transportation Needs (10/14/2023)    Transportation Needs     Lack of Transportation: No   Housing Stability: Low Risk  (10/14/2023)    Housing Stability     Housing Instability: No              Review of Systems    Positive for stated complaint:  fall, ams  Other systems are as noted in HPI.  Constitutional and vital signs reviewed.      All other systems reviewed and negative except as noted above.    Physical Exam     ED Triage Vitals [03/23/24 0720]   /74   Pulse 76   Resp 18   Temp 97.3 °F (36.3 °C)   Temp src Temporal   SpO2 97 %   O2 Device None (Room air)       Current:/61   Pulse 75   Temp 97.3 °F (36.3 °C) (Temporal)   Resp 19   Wt 68 kg   SpO2 100%   BMI 20.34 kg/m²         Physical Exam    General:  Vitals as listed.  Elderly and frail  HEENT: Contusion to left forehead.  No palpable skull fractures.  No lacerations.  Neck: supple, no rigidity   Lungs: good air exchange and clear   Heart: regular rate rhythm and no murmur   Abdomen: Soft and nontender.  No abdominal masses.  No peritoneal signs   Extremities: Edema in bilateral lower extremities that is much worse on the right.  Normal peripheral pulses   Neuro: Awake but does not respond to questioning or commands.  Skin: no rashes or nodules    ED Course     Labs Reviewed   URINALYSIS WITH CULTURE REFLEX - Abnormal; Notable for the following components:       Result Value    Clarity Urine Ex.Turbid (*)     Blood Urine 1+ (*)     Protein Urine 100 (*)     Nitrite Urine 2+ (*)     Leukocyte Esterase Urine 500 (*)     WBC Urine >50 (*)     RBC Urine >10 (*)     Bacteria Urine 3+ (*)     All other components within normal limits   COMP METABOLIC PANEL (14) - Abnormal; Notable for the following components:    Sodium 148 (*)     Chloride 116 (*)     BUN 30 (*)     Creatinine 1.44 (*)     Calculated Osmolality 312 (*)     eGFR-Cr 46 (*)     Albumin 2.7 (*)     A/G Ratio 0.7 (*)     All other components within normal limits   CBC W/ DIFFERENTIAL - Abnormal; Notable for the following components:    HGB 9.2 (*)     HCT 31.7 (*)     MCV 77.9 (*)     MCH 22.6 (*)     MCHC 29.0 (*)     Neutrophil Absolute Prelim 7.79 (*)     Neutrophil Absolute 7.79 (*)     All other components within  normal limits   TROPONIN I HIGH SENSITIVITY - Normal   CK CREATINE KINASE (NOT CREATININE) - Normal   POCT GLUCOSE - Normal   CBC WITH DIFFERENTIAL WITH PLATELET    Narrative:     The following orders were created for panel order CBC With Differential With Platelet.  Procedure                               Abnormality         Status                     ---------                               -----------         ------                     CBC W/ DIFFERENTIAL[[id number]]          Abnormal            Final result                 Please view results for these tests on the individual orders.   LACTIC ACID, PLASMA   RAINBOW DRAW LAVENDER   RAINBOW DRAW LIGHT GREEN   RAINBOW DRAW BLUE   SARS-COV-2/FLU A AND B/RSV BY PCR (GENEXPERT)   URINE CULTURE, ROUTINE   BLOOD CULTURE   BLOOD CULTURE     EKG    Rate, intervals and axes as noted on EKG Report.  Rate: 81  Rhythm: Sinus Rhythm  Reading: First-degree block.  No ST elevation MI                 XR CHEST AP PORTABLE  (CPT=71045)    Result Date: 3/23/2024  PROCEDURE:  XR CHEST AP PORTABLE  (CPT=71045)  TECHNIQUE:  AP chest radiograph was obtained.  COMPARISON:  [facility] , XR, XR CHEST PA + LAT CHEST (CPT=71046), 3/22/2024, 3:25 PM.  INDICATIONS:  fall, ams  PATIENT STATED HISTORY: (As transcribed by Technologist)  Patient states he fell this morning.    FINDINGS:  Large right pleural effusion has increased in size from the prior examination.  Underlying consolidation cannot be excluded.  The left lung is clear.  No left effusion.  No pneumothorax.  Cardiac silhouette is within normal limits.            CONCLUSION:  Large right pleural effusion appears mildly increased in size from prior exam.   LOCATION:  [facility]      Dictated by (CST): Leo Martínez MD on 3/23/2024 at 8:58 AM     Finalized by (CST): Leo Martínez MD on 3/23/2024 at 8:58 AM       CT SPINE CERVICAL (CPT=72125)    Result Date: 3/23/2024  PROCEDURE:  CT SPINE CERVICAL (CPT=72125)  COMPARISON:  [facility] , CT, CT CHEST  (CPT=71250), 10/15/2023, 9:22 PM.  [facility] , XR, XR CHEST PA + LAT CHEST (CPT=71046), 3/22/2024, 3:25 PM.  INDICATIONS:  fall, amsNeck pain after injury.  TECHNIQUE:  Noncontrast CT scanning of the cervical spine is performed from the skull base through C7.  Multiplanar reconstructions are generated.  Dose reduction techniques were used. Dose information is transmitted to the ACR (American College of Radiology) NRDR (National Radiology Data Registry) which includes the Dose Index Registry.  PATIENT STATED HISTORY: (As transcribed by Technologist)  Patient is here for AMS and unwitnessed fall, bruising noted to left side of head.    FINDINGS:   CERVICAL SPINE: There is no evidence for cervical spine fracture, traumatic subluxation, prevertebral swelling, scoliosis or other malalignment.  FACETS:  Facet articulations show no acute offset, or acute appearing asymmetry.  CRANIOCERVICAL JUNCTION:  The craniocervical junction appears preserved. The C1-C2 junction appears preserved.  DEGENERATIVE CHANGES:  There are degenerative changes in the spine.  LIMITED UPPER CHEST:  Partially seen opacification of the right apex; a very recent chest x-ray showed subtotal opacification of the right chest, and the scanogram shows very little aeration of the visible right chest, with some aeration of the medial right suprahilar region.  This can be followed up with any appropriate needed chest imaging.            CONCLUSION:  Degenerative changes involving the cervical spine, but no fracture or subluxation seen.  Partially seen subtotal opacification of the right chest, with only minimal aeration.  Consistent with large right pleural effusion and lung atelectasis, potential malignant effusion with underlying lung malignancy on the right not excluded.    LOCATION:  [facility]   Dictated by (CST): Ricky Scott MD on 3/23/2024 at 8:09 AM     Finalized by (CST): Ricky Scott MD on 3/23/2024 at 8:12 AM       CT BRAIN OR HEAD  (50423)    Result Date: 3/23/2024  PROCEDURE:  CT BRAIN OR HEAD (56823)  COMPARISON:  None.  INDICATIONS:  fall, ams undo is fall bruising left side of the head altered mental status  TECHNIQUE:  Noncontrast CT scanning is performed through the brain. Dose reduction techniques were used. Dose information is transmitted to the ACR (American College of Radiology) NRDR (National Radiology Data Registry) which includes the Dose Index Registry.  PATIENT STATED HISTORY: (As transcribed by Technologist)  Patient is here for AMS and unwitnessed fall, bruising noted to left side of head.    FINDINGS:   Focal extra-axial hyperdensity lateral aspect of the right frontal lobe discoid in shape 16 x 19 x 5 mm AP cephalocaudal and transverse dimensions respectively.  The hyperdensity somewhat heterogeneous.  No other areas of similar attenuation.  Cerebral atrophy with prominence of the ventricles and sulci, along with cerebellar atrophy.  Moderate chronic ischemic white matter disease.  No CT features indicating acute territorial infarct.  No herniation seen.  No skull fracture, acute sinusitis, or mastoiditis.            CONCLUSION:  Focal extra-axial hyperdensity right frontal lobe 16 x 19 x 5 mm.  Differential includes a small extra-axial hemorrhage, such as subdural or epidural hemorrhage, versus a plaque-like discoid meningioma.  No midline shift, mass effect, herniation hydrocephalus.  Suggest CT follow-up.     LOCATION:  [facility]   Dictated by (CST): Ricky Scott MD on 3/23/2024 at 8:05 AM     Finalized by (CST): Ricky Scott MD on 3/23/2024 at 8:09 AM                MDM      89-year-old male presents after an unwitnessed fall.  Bruising to the left forehead and temporal region.  Altered mental status.  Chronic indwelling Pat with purulent appearing urine.    Additional history obtained by patient's son who reports that a chest x-ray performed 1 day ago showed a large right-sided pleural effusion.  He was also  recently on antibiotics for UTI.  Has been progressively deteriorating    Differential includes but is not limited to UTI, pneumonia, dehydration, metabolic disturbance, intracranial hemorrhage, a life threat.    CBC, CMP, CT brain, CT cervical spine, urinalysis, blood culture and lactic acid, cardiac enzymes, viral nasal swab ordered for further evaluation.    Laboratory evaluation shows mild GINETTE with an elevated BUN and creatinine.  Patient's son reports he was recently started on Lasix.  A small fluid bolus of 250 mL was ordered.  Urinalysis returned with evidence of UTI.  Culture from last year showed Pseudomonas.  Zosyn ordered.    My independent interpretation of chest x-ray is that there is a large pleural effusion on the right side.    Radiology reports a 16 x 19 x 5 mm area in the right frontal lobe region that could represent a subdural or epidural hemorrhage.  Viewed with neurosurgery who will monitor.  Discussed case with admitting physician who request pulmonary consult for pleural effusion.  Case was discussed with Dr. Crandall who will follow.  No hypoxemia or respiratory distress at this time.  Neurosurgery feels the patient is okay for the floors at this time.      Critical CARE time:  A total of 40 minutes of critical care time (exclusive of billable procedures) was administered to manage the patient's critical imaging findings due to his right-sided subdural hematoma with altered mental status.  This involved direct patient intervention, complex decision making, and/or extensive discussions with the patient, family, and clinical staff.        Admission disposition: 3/23/2024 10:00 AM                                             Medical Decision Making      Disposition and Plan     Clinical Impression:  1. Altered mental status, unspecified altered mental status type    2. GINETTE (acute kidney injury) (HCC)    3. Acute subdural hematoma (HCC)    4. Urinary tract infection without hematuria, site  unspecified    5. Pleural effusion         Disposition:  Admit  3/23/2024 10:00 am    Follow-up:  No follow-up provider specified.        Medications Prescribed:  Current Discharge Medication List                            Hospital Problems       Present on Admission             ICD-10-CM Noted POA    * (Principal) Altered mental status, unspecified altered mental status type R41.82 3/23/2024 Unknown

## 2024-03-23 NOTE — ED QUICK NOTES
Orders for admission, patient is aware of plan and ready to go upstairs. Any questions, please call ED RN Zuleima at extension [phone].     Patient Covid vaccination status: Fully vaccinated     COVID Test Ordered in ED: SARS-CoV-2/Flu A and B/RSV by PCR ([facility])    COVID Suspicion at Admission: N/A    Running Infusions:      Mental Status/LOC at time of transport: A&Ox1-2, confused. Family at bedside.    Other pertinent information:   CIWA score: N/A   NIH score:  N/A

## 2024-03-23 NOTE — PLAN OF CARE
NURSING ADMISSION NOTE    Patient admitted via Cart  Oriented to room.  Safety precautions initiated.  Bed in low position.  Call light in reach.    Assumed care at 1200 from the ER  Admission navigator completed with family  Alert and oriented x1-2, baseline but confusion worsened  RA. NSR w/ block on tele. VSS  Neuro checks ordered q4 upon admission. Slurred speech and delayed responses at times, baseline per family but slightly worse.   Left eye/forehead bruising from fall  IV abx, IVF  Pt/ot/slp orders placed  Per family, pt has had diarrhea for months-per protocol, c.diff order placed  Pt wheelchair bound at assisted living but was up with a walker a few weeks ago  Pat catheter in place upon admission  Poor appetite  Pt/family updated on pOC  Safety precautions in place

## 2024-03-23 NOTE — ED INITIAL ASSESSMENT (HPI)
Patient brought in from [facility] for unwitnessed fall and AMS. Patient arrives in c collar, ulis catheter in place. Bruising noted to left side of head and left knee.

## 2024-03-24 ENCOUNTER — APPOINTMENT (OUTPATIENT)
Dept: CT IMAGING | Facility: HOSPITAL | Age: 89
End: 2024-03-24
Attending: STUDENT IN AN ORGANIZED HEALTH CARE EDUCATION/TRAINING PROGRAM
Payer: MEDICARE

## 2024-03-24 ENCOUNTER — APPOINTMENT (OUTPATIENT)
Dept: ULTRASOUND IMAGING | Facility: HOSPITAL | Age: 89
End: 2024-03-24
Attending: STUDENT IN AN ORGANIZED HEALTH CARE EDUCATION/TRAINING PROGRAM
Payer: MEDICARE

## 2024-03-24 ENCOUNTER — APPOINTMENT (OUTPATIENT)
Dept: CV DIAGNOSTICS | Facility: HOSPITAL | Age: 89
End: 2024-03-24
Attending: STUDENT IN AN ORGANIZED HEALTH CARE EDUCATION/TRAINING PROGRAM
Payer: MEDICARE

## 2024-03-24 PROBLEM — R19.7 DIARRHEA: Status: ACTIVE | Noted: 2024-03-24

## 2024-03-24 LAB
ADENOVIRUS F 40/41 PCR: NEGATIVE
ALBUMIN SERPL-MCNC: 2.3 G/DL (ref 3.4–5)
ALBUMIN/GLOB SERPL: 0.6 {RATIO} (ref 1–2)
ALP LIVER SERPL-CCNC: 74 U/L
ALT SERPL-CCNC: 13 U/L
ANION GAP SERPL CALC-SCNC: 3 MMOL/L (ref 0–18)
AST SERPL-CCNC: 13 U/L (ref 15–37)
ASTROVIRUS PCR: NEGATIVE
BASOPHILS # BLD AUTO: 0.06 X10(3) UL (ref 0–0.2)
BASOPHILS NFR BLD AUTO: 0.6 %
BILIRUB SERPL-MCNC: 0.4 MG/DL (ref 0.1–2)
BUN BLD-MCNC: 32 MG/DL (ref 9–23)
C CAYETANENSIS DNA SPEC QL NAA+PROBE: NEGATIVE
C DIFF TOX B STL QL: NEGATIVE
CALCIUM BLD-MCNC: 8 MG/DL (ref 8.5–10.1)
CAMPY SP DNA.DIARRHEA STL QL NAA+PROBE: NEGATIVE
CHLORIDE SERPL-SCNC: 120 MMOL/L (ref 98–112)
CO2 SERPL-SCNC: 26 MMOL/L (ref 21–32)
CREAT BLD-MCNC: 1.25 MG/DL
CRYPTOSP DNA SPEC QL NAA+PROBE: NEGATIVE
EAEC PAA PLAS AGGR+AATA ST NAA+NON-PRB: NEGATIVE
EC STX1+STX2 + H7 FLIC SPEC NAA+PROBE: NEGATIVE
EGFRCR SERPLBLD CKD-EPI 2021: 54 ML/MIN/1.73M2 (ref 60–?)
ENTAMOEBA HISTOLYTICA PCR: NEGATIVE
EOSINOPHIL # BLD AUTO: 0.12 X10(3) UL (ref 0–0.7)
EOSINOPHIL NFR BLD AUTO: 1.2 %
EPEC EAE GENE STL QL NAA+NON-PROBE: NEGATIVE
ERYTHROCYTE [DISTWIDTH] IN BLOOD BY AUTOMATED COUNT: 20.4 %
ETEC LTA+ST1A+ST1B TOX ST NAA+NON-PROBE: NEGATIVE
GIARDIA LAMBLIA PCR: NEGATIVE
GLOBULIN PLAS-MCNC: 3.8 G/DL (ref 2.8–4.4)
GLUCOSE BLD-MCNC: 100 MG/DL (ref 70–99)
HCT VFR BLD AUTO: 28.5 %
HGB BLD-MCNC: 8.9 G/DL
IMM GRANULOCYTES # BLD AUTO: 0.04 X10(3) UL (ref 0–1)
IMM GRANULOCYTES NFR BLD: 0.4 %
LYMPHOCYTES # BLD AUTO: 1.52 X10(3) UL (ref 1–4)
LYMPHOCYTES NFR BLD AUTO: 15 %
MCH RBC QN AUTO: 23.2 PG (ref 26–34)
MCHC RBC AUTO-ENTMCNC: 31.2 G/DL (ref 31–37)
MCV RBC AUTO: 74.2 FL
MONOCYTES # BLD AUTO: 0.81 X10(3) UL (ref 0.1–1)
MONOCYTES NFR BLD AUTO: 8 %
NEUTROPHILS # BLD AUTO: 7.56 X10 (3) UL (ref 1.5–7.7)
NEUTROPHILS # BLD AUTO: 7.56 X10(3) UL (ref 1.5–7.7)
NEUTROPHILS NFR BLD AUTO: 74.8 %
NOROVIRUS GI/GII PCR: NEGATIVE
OSMOLALITY SERPL CALC.SUM OF ELEC: 315 MOSM/KG (ref 275–295)
P SHIGELLOIDES DNA STL QL NAA+PROBE: NEGATIVE
PLATELET # BLD AUTO: 234 10(3)UL (ref 150–450)
POTASSIUM SERPL-SCNC: 3.5 MMOL/L (ref 3.5–5.1)
PROT SERPL-MCNC: 6.1 G/DL (ref 6.4–8.2)
RBC # BLD AUTO: 3.84 X10(6)UL
ROTAVIRUS A PCR: NEGATIVE
SALMONELLA DNA SPEC QL NAA+PROBE: NEGATIVE
SAPOVIRUS PCR: NEGATIVE
SHIGELLA SP+EIEC IPAH ST NAA+NON-PROBE: NEGATIVE
SODIUM SERPL-SCNC: 149 MMOL/L (ref 136–145)
V CHOLERAE DNA SPEC QL NAA+PROBE: NEGATIVE
VIBRIO DNA SPEC NAA+PROBE: NEGATIVE
WBC # BLD AUTO: 10.1 X10(3) UL (ref 4–11)
YERSINIA DNA SPEC NAA+PROBE: NEGATIVE

## 2024-03-24 PROCEDURE — 70450 CT HEAD/BRAIN W/O DYE: CPT | Performed by: STUDENT IN AN ORGANIZED HEALTH CARE EDUCATION/TRAINING PROGRAM

## 2024-03-24 PROCEDURE — 93971 EXTREMITY STUDY: CPT | Performed by: STUDENT IN AN ORGANIZED HEALTH CARE EDUCATION/TRAINING PROGRAM

## 2024-03-24 PROCEDURE — 93306 TTE W/DOPPLER COMPLETE: CPT | Performed by: STUDENT IN AN ORGANIZED HEALTH CARE EDUCATION/TRAINING PROGRAM

## 2024-03-24 PROCEDURE — 99231 SBSQ HOSP IP/OBS SF/LOW 25: CPT | Performed by: NEUROLOGICAL SURGERY

## 2024-03-24 PROCEDURE — 99232 SBSQ HOSP IP/OBS MODERATE 35: CPT | Performed by: STUDENT IN AN ORGANIZED HEALTH CARE EDUCATION/TRAINING PROGRAM

## 2024-03-24 RX ORDER — IPRATROPIUM BROMIDE AND ALBUTEROL SULFATE 2.5; .5 MG/3ML; MG/3ML
3 SOLUTION RESPIRATORY (INHALATION) EVERY 4 HOURS PRN
Status: DISCONTINUED | OUTPATIENT
Start: 2024-03-24 | End: 2024-03-28

## 2024-03-24 NOTE — SLP NOTE
ADULT SWALLOWING EVALUATION    ASSESSMENT    ASSESSMENT/OVERALL IMPRESSION:  Pt seen this PM for bedside swallow evaluation. RN reported speech consulted due to family observed s/s of aspiration during mealtimes prior to admission. Pt admitted to hospital due to fall with AMS. Pt diagnosed with R temporal SDH, UTI, and pleural effusion. Pt's medical history significant for pneumonia, HTN, GINETTE, orthostatic hypotension, chronic luis, HLD, and dementia. Upon entering the room pt seated upright in chair with meal tray. Pt uncooperative, agitated, and alert. Pt able to follow simple 1-step commands with max verbal and visual cues to encourage participation. No family or caregivers present at bedside. Pt denied history of dysphagia or ongoing symptoms of dysphagia, however, pt may be unreliable historian. Pt familiar to this department with bedside swallow evaluation completed 10/16/23 recommended regular diet and thin liquids. VFSS recommended, however, no study furnished in chart.     Oral motor abilities functional. Trial thin liquids, mixed thin textures (soup), hard solids, and pureed with wet vocal quality after all trials and occasional throat clear. Pt impulsive and ate/drank at rapid rate, which suspect contributed to dysphagia symptoms observed. Reduced rate of intake and no straw eliminated symptoms, however, pt became agitated and yelled \"I want to eat how I eat\". Pt with residue in oral cavity after hard solid trials, liquid wash eliminated residue.    Recommend continue with thin liquids and soft/easy to chew diet. Further recommend 1:1 supervision to utilize compensatory strategies/swallow precautions. Further diet modification may be warranted if pt is uncooperative with precautions and strategies. Plan for possible thoracentesis Monday, post procedure, consider VFSS to be completed given previous recommendation and symptoms present at bedside and reported by family. Additionally, plan to discuss baseline  cognitive abilities with family; pt may benefit from cognitive communication evaluation given acute neurological findings once UTI cleared.       RECOMMENDATIONS   Diet Recommendations - Solids: Soft/ Easy to chew  Diet Recommendations - Liquids: Thin Liquids      Compensatory Strategies Recommended: Slow rate;Small bites and sips;Multiple swallows;No straws;Extra sauce/gravy;Alternate consistencies  Aspiration Precautions: Upright position;Slow rate;Small bites and sips;Cueing to swallow  Medication Administration Recommendations: One pill at a time  Treatment Plan/Recommendations: Aspiration precautions;Dysphagia therapy (consider VFSS)    HISTORY   MEDICAL HISTORY  Reason for Referral: R/O aspiration    Problem List  Principal Problem:    Altered mental status, unspecified altered mental status type  Active Problems:    GINETTE (acute kidney injury) (HCC)    Acute subdural hematoma (HCC)    Urinary tract infection without hematuria, site unspecified    Pleural effusion    Diarrhea      Past Medical History  Past Medical History:   Diagnosis Date    Essential hypertension     Pat catheter in place     H/O transurethral resection of prostate     Hypercholesterolemia     Neuropathy     Urinary retention        Prior Living Situation: Assisted living  Diet Prior to Admission: Regular;Thin liquids  Precautions: Hard of hearing (Right ear worse; talk on Left)    Patient/Family Goals: To eat and drink safely    SWALLOWING HISTORY  Current Diet Consistency: Regular;Thin liquids  Dysphagia History: See above  Imaging Results: CT Brain 3/24/24:   Impression   CONCLUSION:  Stable noncontrast head CT with stable extra-axial hyperdensity measuring 16 x 19 x 5 mm overlying the right frontal lobe.  This may represent a partially calcified meningioma.  The subdural hemorrhage is a differential consideration.  This  appears stable on follow-up scan.  No interval acute hemorrhage.     CXR 3/24/24:   Impression   CONCLUSION:  Large  right pleural effusion appears mildly increased in size from prior exam.         OBJECTIVE   ORAL MOTOR EXAMINATION  Dentition: Functional;Upper dentures;Lower dentures  Symmetry: Within Functional Limits  Strength: Within Functional Limits  Tone: Within Functional Limits  Range of Motion: Within Functional Limits  Rate of Motion: Within Functional Limits    Voice Quality: Clear  Respiratory Status: Unlabored  Consistencies Trialed: Thin liquids;Puree;Hard solid  Method of Presentation: Self presentation;Spoon;Cup;Straw;Single sips;Consecutive swallows  Patient Positioning: Upright;Standard chair    Oral Phase of Swallow: Impaired  Bolus Retrieval: Intact  Bilabial Seal: Intact  Bolus Formation: Intact  Bolus Propulsion: Intact  Mastication: Impaired  Retention: Impaired    Pharyngeal Phase of Swallow: Impaired  Laryngeal Elevation Timing: Appears impaired  Laryngeal Elevation Strength: Appears impaired  Laryngeal Elevation Coordination: Appears impaired  (Please note: Silent aspiration cannot be evaluated clinically. Videofluoroscopic Swallow Study is required to rule-out silent aspiration.)    Esophageal Phase of Swallow: No complaints consistent with possible esophageal involvement      GOALS  Goal #1 The patient will tolerate soft solids consistency and thin liquids without overt signs or symptoms of aspiration with 100 % accuracy over 1-2 session(s). New Goal   Goal #2 The patient/family/caregiver will demonstrate understanding and implementation of aspiration precautions and swallow strategies independently over 1-2 session(s).   New Goal     FOLLOW UP  Treatment Plan/Recommendations: Aspiration precautions;Dysphagia therapy (consider VFSS)  Number of Visits to Meet Established Goals: 2  Follow Up Needed (Documentation Required): Yes  SLP Follow-up Date: 03/25/24    Thank you for your referral.   If you have any questions, please contact SARAH Evans

## 2024-03-24 NOTE — PROGRESS NOTES
[facility]    David Loyola Patient Status:  Inpatient    3/22/1933 MRN [id number]   Location [facility] 7NE-A Attending Pao Bailey,    Hosp Day # 1 PCP Js Espana DO     Pulm / Critical Care Progress Note     S: pt states that he feels ok. Denies dyspnea      Scheduled Medication:   piperacillin-tazobactam  3.375 g Intravenous Q8H    escitalopram  5 mg Oral Daily    finasteride  5 mg Oral Daily    furosemide  40 mg Oral BID (Diuretic)    pantoprazole  20 mg Oral QAM AC     Continuous Infusing Medication:   sodium chloride 100 mL/hr at 24 1145     PRN Medication:acetaminophen, melatonin, guaiFENesin ER, benzonatate, glycerin-hypromellose-, sodium chloride, ondansetron, metoclopramide, polyethylene glycol (PEG 3350), sennosides, bisacodyl, fleet enema       OBJECTIVE:  Vitals:    24 1600 24 1900 24 0000 24 0400   BP: 121/53 108/52 112/60 119/68   BP Location: Left arm Left arm Left arm Left arm   Pulse: 58 86 73 76   Resp: 20 21 20 20   Temp: 98.8 °F (37.1 °C) 98.4 °F (36.9 °C) 98.2 °F (36.8 °C) 98.5 °F (36.9 °C)   TempSrc: Oral Oral Oral Oral   SpO2: 94% 91% 91% 93%   Weight:            O2: ra      Wt Readings from Last 3 Encounters:   24 150 lb (68 kg)   10/14/23 145 lb (65.8 kg)   10/12/23 143 lb (64.9 kg)        I/O last 3 completed shifts:  In: 120 [P.O.:120]  Out: 250 [Urine:250]  I/O this shift:  In: -   Out: 900 [Urine:900]     Physical Exam:   General: alert, cooperative,  No respiratory distress.   Head: Normocephalic, without obvious abnormality, atraumatic.   Lungs: diminished on R   Chest wall: No tenderness or deformity.   Heart: Regular rate and rhythm, normal S1S2, no murmur.   Abdomen: soft, non-tender, non-distended, positive BS.   Extremity: no edema       Recent Labs   Lab 24  0725 24  0528   WBC 11.0 10.1   HGB 9.2* 8.9*   HCT 31.7* 28.5*   .0 234.0     Recent Labs   Lab 24  0725   INR 1.20         Recent  Labs   Lab 03/23/24  0725 03/24/24  0528   * 149*   K 3.5 3.5   * 120*   CO2 27.0 26.0   BUN 30* 32*   CREATSERUM 1.44* 1.25   GLU 96 100*   CA 8.5 8.0*   AST 24 13*   ALT 17 13*   ALKPHO 85 74   BILT 0.5 0.4   ALB 2.7* 2.3*   TP 6.8 6.1*       Creatinine (mg/dL)   Date Value   03/24/2024 1.25   03/23/2024 1.44 (H)   10/16/2023 1.25   ]             ASSESSMENT/PLAN:    R sided effusion: large, presumed to be cardiac in origin though has not yet been sampled.   - will eventually require thoracentesis for at least diagnostic sample   - effusion present since at least 10/2023  - once we are sure there is not an active neuro issue then can plan thora on Monday. Can perform sooner if resp status declines however pt is on room air and does not appear dyspneic.   - check echo   Subdural hematoma: suggested on ct head   - seen by neurosurgery; observation for now  - repeat ct today pending per neurosurg     Will follow    Olivia Crandall M.D.  [facility]  Pulmonary / Critical care  3/24/2024  6:53 AM

## 2024-03-24 NOTE — PLAN OF CARE
Assumed care at 1900.   Alert and oriented x1; delayed responses, intermittent slurred speech; neuros with no new deficits.   NSR; RA; denies pain.   Pat intact; bowel movement noted on shift; Cdiff results pending.   CT brain and Echo pending.   Fall precautions in place and call light within reach.

## 2024-03-24 NOTE — PROGRESS NOTES
[facility]   part of [facility]     Hospitalist Progress Note     David Loyola Patient Status:  Inpatient    3/22/1933 MRN [id number]   Location [facility] 7NE-A Attending Pao Bailey DO   Hosp Day # 1 PCP Js Espana DO     Chief Complaint: Fall     Subjective:     Patient resting in bed. He is more alert compared to yesterday. He denies any fevers, chills, nausea, vomiting, abdominal pain, dyspnea     Objective:    Review of Systems:   A comprehensive review of systems was completed; pertinent positive and negatives stated in subjective.    Vital signs:  Temp:  [96.8 °F (36 °C)-98.8 °F (37.1 °C)] 98.2 °F (36.8 °C)  Pulse:  [54-86] 79  Resp:  [18-21] 18  BP: (106-139)/(52-99) 106/60  SpO2:  [91 %-99 %] 93 %    Physical Exam:    General: No acute distress  Respiratory: No wheezes, no rhonchi  Cardiovascular: S1, S2, regular rate and rhythm  Abdomen: Soft, Non-tender, non-distended, positive bowel sounds  Neuro: No new focal deficits.   Extremities: RLE edema     Diagnostic Data:    Labs:  Recent Labs   Lab 24  0725 24  0528   WBC 11.0 10.1   HGB 9.2* 8.9*   MCV 77.9* 74.2*   .0 234.0   INR 1.20  --        Recent Labs   Lab 24  0725 24  0528   GLU 96 100*   BUN 30* 32*   CREATSERUM 1.44* 1.25   CA 8.5 8.0*   ALB 2.7* 2.3*   * 149*   K 3.5 3.5   * 120*   CO2 27.0 26.0   ALKPHO 85 74   AST 24 13*   ALT 17 13*   BILT 0.5 0.4   TP 6.8 6.1*       Estimated Creatinine Clearance: 37 mL/min (based on SCr of 1.25 mg/dL).    Recent Labs   Lab 24  0725   TROPHS 14   CK 53       Recent Labs   Lab 24  0725   PTP 15.3*   INR 1.20                  Microbiology    Hospital Encounter on 24   1. Urine Culture, Routine     Status: Abnormal (Preliminary result)    Collection Time: 24  7:24 AM    Specimen: Urine, luis catheter   Result Value Ref Range    Urine Culture >100,000 CFU/ML Pseudomonas aeruginosa (A) N/A         Imaging: Reviewed in  Epic.    Medications:    piperacillin-tazobactam  3.375 g Intravenous Q8H    escitalopram  5 mg Oral Daily    finasteride  5 mg Oral Daily    furosemide  40 mg Oral BID (Diuretic)    pantoprazole  20 mg Oral QAM AC       Assessment & Plan:      #Mechanical fall  -CT head reviewed  -CT C-spine reviewed  -PT OT consulted     #Small extra-axial hemorrhage, subdural versus epidural  -CT head reviewed  -Repeat CT head reviewed   -Neurosurgery following     #Large right pleural effusion  -Chest x-ray reviewed  -Remains on room air  -Echo ordered  -Plan for thoracentesis Monday, possibly sooner if respiratory status declines  -Pulmonology following     #UTI  -Blood cultures in lab  -Urine culture in lab  -IV antibiotics     #Hypernatremia  #GINETTE, resolved   -Likely in the setting of dehydration  -IV fluids    #Diarrhea  -Ongoing for months  -C diff ordered  -Stool PCR ordered  -Will consider GI consult if no improvement     #RLE edema  -Chronically swollen but appears more swollen per family  -RLE US ordered      #History of urinary retention  -Pat in place prior to arrival     #GERD  -PPI       Pao Bailey DO    Supplementary Documentation:     Quality:  DVT Mechanical Prophylaxis:   SCDs,    DVT Pharmacologic Prophylaxis   Medication   None                Code Status: DNAR/Full Treatment  Pat: Pat catheter in place  Pat Duration (in days): 2  Central line:    SULEMA:     Discharge is dependent on: Clinical improvemetn   At this point Mr. Loyola is expected to be discharge to: Home     The 21st Century Cures Act makes medical notes like these available to patients in the interest of transparency. Please be advised this is a medical document. Medical documents are intended to carry relevant information, facts as evident, and the clinical opinion of the practitioner. The medical note is intended as peer to peer communication and may appear blunt or direct. It is written in medical language and may contain abbreviations  or verbiage that are unfamiliar.

## 2024-03-24 NOTE — PHYSICAL THERAPY NOTE
PHYSICAL THERAPY EVALUATION - INPATIENT     Room Number: 7611/7611-A  Evaluation Date: 3/24/2024  Type of Evaluation: Initial  Physician Order: PT Eval and Treat    Presenting Problem: AMS-s/p fall, UTI and pleural effusion; acute SDH  Co-Morbidities : HTN, BPH, chronic luis d/t urinary retention, R chronic torn glut med  Reason for Therapy: Mobility Dysfunction and Discharge Planning    CT of brain 3/24/24-Impression  CONCLUSION:  Stable noncontrast head CT with stable extra-axial hyperdensity measuring 16 x 19 x 5 mm overlying the right frontal lobe.  This may represent a partially calcified meningioma.  The subdural hemorrhage is a differential consideration.  This   appears stable on follow-up scan.  No interval acute hemorrhage.    CT of cx spine 3/23/24-CONCLUSION:  Degenerative changes involving the cervical spine, but no fracture or subluxation seen.  Partially seen subtotal opacification of the right chest, with only minimal aeration.  Consistent with large right pleural effusion and lung   atelectasis, potential malignant effusion with underlying lung malignancy on the right not excluded.    CT of brain 3/23/24-CONCLUSION:  Focal extra-axial hyperdensity right frontal lobe 16 x 19 x 5 mm.  Differential includes a small extra-axial hemorrhage, such as subdural or epidural hemorrhage, versus a plaque-like discoid meningioma.  No midline shift, mass effect, herniation hydrocephalus.  Suggest CT follow-up.      PHYSICAL THERAPY ASSESSMENT   Patient is currently functioning below baseline with bed mobility, transfers, and gait.  Prior to admission, patient's baseline is prior to the last several wks, being able to assist c bed mobility, t/f and short distance amb using a RW.  Patient is requiring moderate assist as a result of the following impairments: decreased functional strength, decreased endurance/aerobic capacity, impaired static/dynamic standing balance, impaired motor planning, decreased  muscular endurance, and decreased compliance/participation.  Physical Therapy will continue to follow for duration of hospitalization.    Patient will benefit from continued skilled PT Services to promote return to prior level of function and safety with continuous assistance and gradual rehabilitative therapy .  D/w his son (POA) via phone following PT eval and he stated that he has been slowly declining and has the ability to demonstrate improvement which was seen today c him being able to take some assisted steps using RW.     PLAN  PT Treatment Plan: Bed mobility;Body mechanics;Endurance;Energy conservation;Patient education;Family education;Gait training;Strengthening;Transfer training;Balance training  Rehab Potential : Good  Frequency (Obs): 3-5x/week  Number of Visits to Meet Established Goals: 5      CURRENT GOALS    Goal #1 Patient is able to demonstrate supine - sit EOB @ level: supervision     Goal #2 Patient is able to demonstrate transfers EOB to/from Chair/Wheelchair at assistance level: minimum assistance     Goal #3 Patient is able to ambulate 5 feet with assist device: walker - rolling at assistance level: moderate assistance     Goal #4    Goal #5    Goal #6    Goal Comments: Goals established on 3/24/2024      PHYSICAL THERAPY MEDICAL/SOCIAL HISTORY  History related to current admission: Patient is a 89 year old male admitted on 3/23/2024 from [facility] for s/p fall.  Pt diagnosed with AMS-s/p fall, UTI and pleural effusion; acute SDH.      HOME SITUATION  Type of Home: Assisted living facility ([facility])   Home Layout: One level                Lives With: Spouse;Caregiver part-time  Drives: No  Patient Owned Equipment: Rolling walker;Wheelchair (grab bars)  Patient Regularly Uses: Reading glasses    Prior Level of [place]: Pt is a questionable historian and only A/O x self/ currently. He is very pleasant and amicable. Per RN has been fidgety and attempting to get out of bed  intermittently. Called his son (POA) regarding his PLOF- pt has had a slow decline over the last several wks and was previously completing t/f and short distance amb using a RW c decr assist. Recently he has been requiring mod/max A for bed mobility, t/f and non ambulatory, strictly using a WC.     SUBJECTIVE  \"I can do whatever you want to do\"      OBJECTIVE  Precautions: Bed/chair alarm;Restraints;Hard of hearing;Other (Comment) (chair alarm and seatbelt; hears from L side better)  Fall Risk: High fall risk    WEIGHT BEARING RESTRICTION  Weight Bearing Restriction: None                PAIN ASSESSMENT  Ratin  Location: denies pn       COGNITION  Overall Cognitive Status:  Impaired  Arousal/Alertness:  delayed responses to stimuli  Orientation Level:  oriented to person, disoriented to place, disoriented to time, and disoriented to situation  Following Commands:  follows one step commands with increased time and follows one step commands with repetition  Initiation: cues to initiate tasks  Safety Judgement:  decreased awareness of need for assistance and decreased awareness of need for safety    RANGE OF MOTION AND STRENGTH ASSESSMENT  Upper extremity ROM and strength are within functional limits-able to functionally reach OH, grasp B and resist biceps/triceps    Lower extremity ROM is within functional limits-RLE resting in supine/sitting c hip IR and knee valgus, however functional ROM    Lower extremity strength is: LLE hip flex 3+/5, quad 3+/5, ankle 4-/5; RLE hip flex 3/5, quad 3+/5, ankle 3+/5      BALANCE  Static Sitting: Fair  Dynamic Sitting: Fair -  Static Standing: Poor +  Dynamic Standing: Poor    ADDITIONAL TESTS  Additional Tests: Edema                         Edema: Mild pitting, slight indentation  Edema Location: R>LLE    ACTIVITY TOLERANCE                         O2 WALK       NEUROLOGICAL FINDINGS  Neurological Findings: Sensation           Sensation: BLE symmetrical/intact to light touch          AM-PAC '6-Clicks' INPATIENT SHORT FORM - BASIC MOBILITY  How much difficulty does the patient currently have...  Patient Difficulty: Turning over in bed (including adjusting bedclothes, sheets and blankets)?: A Little   Patient Difficulty: Sitting down on and standing up from a chair with arms (e.g., wheelchair, bedside commode, etc.): A Lot   Patient Difficulty: Moving from lying on back to sitting on the side of the bed?: A Lot   How much help from another person does the patient currently need...   Help from Another: Moving to and from a bed to a chair (including a wheelchair)?: A Lot   Help from Another: Need to walk in hospital room?: A Lot   Help from Another: Climbing 3-5 steps with a railing?: Total       AM-PAC Score:  Raw Score: 12   Approx Degree of Impairment: 68.66%   Standardized Score (AM-PAC Scale): 35.33   CMS Modifier (G-Code): CL    FUNCTIONAL ABILITY STATUS  Gait Assessment   Functional Mobility/Gait Assessment  Gait Assistance: Moderate assistance  Distance (ft): 3-4 steps  Assistive Device: Rolling walker  Pattern: Shuffle;R Decreased stance time;Uncompensated trendelenburg;R Flexed knee;L Flexed knee;R Steppage    Skilled Therapy Provided     Bed Mobility:  Rolling: NT  Supine to sit: min A for trunk    Sit to supine: NT     Transfer Mobility:  Sit to stand: mod A (ht of the bed elevated for ease of t/f; RLE guarded to deter sliding) c RW   Stand to sit: mod A (poor eccentric control, however able to scoot back in the chair indep)  Gait = 3-4 steps c RW and mod A-see above    Therapist's Comments: Pt presents to PT lying in semi supine position in bed c rehab aide. Pt is [place], however noted to hear better out of his L ear and following commands c repetition. Pt is amicable and agreeable to participate. Pt able to t/f supine/sit c CGA primarily, however brief min A for trunk towards the end of the t/f. Pt unable to scoot at the EOB and required total assist to complete. While in sitting,  demonstrated good static sitting balance and able to reach out of MARIAJOSE without LOB laterally or posteriorly. Socks donned c total assist. Ht of bed elevated for ease of t/f and chair prepared for t/f if possible. Sit/stand c mod A and RW. Mild guarding of RLE provided to deter sliding. Pt demo's decr stance on RLE, shuffled pattern, steppage gait and trendelenburg pattern, however able to amb 3-4 steps c RW and mod A from EOB >BSC. During gait, the pt asked \"is it necessary to keep walking?\" We then guided to the chair, however both verbal and tactile cues provided for safe t/f to sitting. Once sitting, pt was able to scoot and reposition himself back in the chair safely. Chair alarm intact and seat belt monitor, BLE elev c blanket, slightly reclined and pt provided c the paper. RN aware of session and how to t/f back to bed, son called as above.     Exercise/Education Provided:  Bed mobility  Body mechanics  Functional activity tolerated  Gait training  Posture  Transfer training    Patient End of Session: Up in chair;Needs met;Call light within reach;RN aware of session/findings;All patient questions and concerns addressed;Alarm set;Other (Comment) (d/w son (POA) via phone; chair alarm and seatbelt)      Patient Evaluation Complexity Level:  History Moderate - 1 or 2 personal factors and/or co-morbidities   Examination of body systems Moderate - addressing a total of 3 or more elements   Clinical Presentation Moderate - Evolving   Clinical Decision Making Moderate - Evolving       PT Session Time: 40 minutes  Gait Trainin minutes  Therapeutic Activity: 10 minutes

## 2024-03-24 NOTE — PROGRESS NOTES
[facility]   part of [facility]    Neurosurgery Progress Note  3/24/2024    David Loyola Patient Status:  Inpatient    3/22/1933 MRN [id number]   [address]-A Attending Pao Bailey DO   Hosp Day # 1 PCP Js Espana DO     SUBJECTIVE:  David Loyola is a(n) 89 year old male s/p fall.  CT head shows a subtle R temporal CSDH.  Repeat CT head is stable.  No acute overnight events.       OBJECTIVE / PHYSICAL EXAM:  Vital Signs:  Blood pressure 117/51, pulse 79, temperature 98.2 °F (36.8 °C), temperature source Oral, resp. rate 18, weight 150 lb (68 kg), SpO2 99%.    Neuro: stable        Lab Results (last 24 hours):  Recent Results (from the past 24 hour(s))   Comp Metabolic Panel (14)    Collection Time: 24  5:28 AM   Result Value Ref Range    Glucose 100 (H) 70 - 99 mg/dL    Sodium 149 (H) 136 - 145 mmol/L    Potassium 3.5 3.5 - 5.1 mmol/L    Chloride 120 (H) 98 - 112 mmol/L    CO2 26.0 21.0 - 32.0 mmol/L    Anion Gap 3 0 - 18 mmol/L    BUN 32 (H) 9 - 23 mg/dL    Creatinine 1.25 0.70 - 1.30 mg/dL    Calcium, Total 8.0 (L) 8.5 - 10.1 mg/dL    Calculated Osmolality 315 (H) 275 - 295 mOsm/kg    eGFR-Cr 54 (L) >=60 mL/min/1.73m2    AST 13 (L) 15 - 37 U/L    ALT 13 (L) 16 - 61 U/L    Alkaline Phosphatase 74 45 - 117 U/L    Bilirubin, Total 0.4 0.1 - 2.0 mg/dL    Total Protein 6.1 (L) 6.4 - 8.2 g/dL    Albumin 2.3 (L) 3.4 - 5.0 g/dL    Globulin  3.8 2.8 - 4.4 g/dL    A/G Ratio 0.6 (L) 1.0 - 2.0   CBC W/ DIFFERENTIAL    Collection Time: 24  5:28 AM   Result Value Ref Range    WBC 10.1 4.0 - 11.0 x10(3) uL    RBC 3.84 3.80 - 5.80 x10(6)uL    HGB 8.9 (L) 13.0 - 17.5 g/dL    HCT 28.5 (L) 39.0 - 53.0 %    .0 150.0 - 450.0 10(3)uL    MCV 74.2 (L) 80.0 - 100.0 fL    MCH 23.2 (L) 26.0 - 34.0 pg    MCHC 31.2 31.0 - 37.0 g/dL    RDW 20.4 %    Neutrophil Absolute Prelim 7.56 1.50 - 7.70 x10 (3) uL    Neutrophil Absolute 7.56 1.50 - 7.70 x10(3) uL    Lymphocyte Absolute 1.52 1.00  - 4.00 x10(3) uL    Monocyte Absolute 0.81 0.10 - 1.00 x10(3) uL    Eosinophil Absolute 0.12 0.00 - 0.70 x10(3) uL    Basophil Absolute 0.06 0.00 - 0.20 x10(3) uL    Immature Granulocyte Absolute 0.04 0.00 - 1.00 x10(3) uL    Neutrophil % 74.8 %    Lymphocyte % 15.0 %    Monocyte % 8.0 %    Eosinophil % 1.2 %    Basophil % 0.6 %    Immature Granulocyte % 0.4 %       Review of Imaging  CT head: as above    Assessment/Plan:  CSDH  No surgical intervention  Hold anticoagulation  Repeat CT head in 1 month  Will sign off  Please call with jose enrique Henry DO    3/24/2024  12:59 PM

## 2024-03-24 NOTE — PLAN OF CARE
Assumed care at 0730  Alert and oriented x1-2  RA. NSR on tele. VSS  Prn nebs ordered for wheezing in the afternoon  Neuro checks done. Slurred/delayed speech intermittently. Pt at times gets confused, needs reorientation  Denies any pain  IVF, IV abx  Voiding w/ luis  BM x2. Stool sample sent  US doppler (-)  Up 1-2 pivot to chair.   Pt/family updated on POC. Family at bedside.   Safety precautions in place

## 2024-03-25 ENCOUNTER — APPOINTMENT (OUTPATIENT)
Dept: ULTRASOUND IMAGING | Facility: HOSPITAL | Age: 89
End: 2024-03-25
Attending: INTERNAL MEDICINE
Payer: MEDICARE

## 2024-03-25 ENCOUNTER — APPOINTMENT (OUTPATIENT)
Dept: GENERAL RADIOLOGY | Facility: HOSPITAL | Age: 89
End: 2024-03-25
Attending: INTERNAL MEDICINE
Payer: MEDICARE

## 2024-03-25 PROBLEM — N30.00 ACUTE CYSTITIS WITHOUT HEMATURIA: Status: ACTIVE | Noted: 2024-03-25

## 2024-03-25 LAB
BASOPHILS NFR PLR: 0 %
CHOLEST PLR-MCNC: <50 MG/DL
COLOR FLD: YELLOW
EOSINOPHIL NFR PLR: 0 %
GLUCOSE PLR-MCNC: 100 MG/DL
LDH FLD L TO P-CCNC: 118 U/L
LYMPHOCYTES NFR PLR: 37 %
MONOS+MACROS NFR PLR: 52 %
NEUTROPHILS NFR PLR: 11 %
PH PLR: 7.63 [PH] (ref 7.2–7.5)
PROT PLR-MCNC: 4.1 G/DL
RBC PLEURAL FLUID: 8000 /MM3
TOTAL CELLS COUNTED FLD: 100
WBC # PLR: 269 /MM3

## 2024-03-25 PROCEDURE — 99233 SBSQ HOSP IP/OBS HIGH 50: CPT | Performed by: HOSPITALIST

## 2024-03-25 PROCEDURE — 32555 ASPIRATE PLEURA W/ IMAGING: CPT | Performed by: INTERNAL MEDICINE

## 2024-03-25 PROCEDURE — 0W993ZZ DRAINAGE OF RIGHT PLEURAL CAVITY, PERCUTANEOUS APPROACH: ICD-10-PCS | Performed by: INTERNAL MEDICINE

## 2024-03-25 RX ORDER — DEXTROSE MONOHYDRATE 50 MG/ML
INJECTION, SOLUTION INTRAVENOUS CONTINUOUS
Status: DISCONTINUED | OUTPATIENT
Start: 2024-03-25 | End: 2024-03-26

## 2024-03-25 RX ORDER — LOSARTAN POTASSIUM 25 MG/1
25 TABLET ORAL DAILY
Status: DISCONTINUED | OUTPATIENT
Start: 2024-03-25 | End: 2024-03-28

## 2024-03-25 RX ORDER — LOSARTAN POTASSIUM 25 MG/1
12.5 TABLET ORAL DAILY
Status: DISCONTINUED | OUTPATIENT
Start: 2024-03-25 | End: 2024-03-25

## 2024-03-25 RX ORDER — MULTIPLE VITAMINS W/ MINERALS TAB 9MG-400MCG
1 TAB ORAL DAILY
Status: DISCONTINUED | OUTPATIENT
Start: 2024-03-26 | End: 2024-03-28

## 2024-03-25 NOTE — PROGRESS NOTES
89 y/o male with hx chronic luis, HTN, dyslipidemia presents with fall and found to have subdural hematoma being treated conservatively UTI and pleural effusion. On Abx. Echo EF 35%. It was 55% by STANISLAV at [place] in 2023 with flail mitral chord and moderate AI.Now with moderate severe AIand PA 40-45. . Given his age I would simply add ARB to his lasix to try and maintain EF and keep out of hospital. I do not feel full blown GDMT necessary at this point. Interestingly he was  outside of [place] and moved here to be closer to family. Follow renal fxn and CT head.

## 2024-03-25 NOTE — PLAN OF CARE
Pt alert to only self, but goes between 1 to 2.   RA but audible wheezing without stethoscope auscultation, based on notes, patient has diastolic heart failure. Fluids stopped. Pt refused nebs and stated \"Just let me sleep.\" Neuros completed. Pt had two Bms overnight, liquid. Chronic Pat in place. Pat care done. Antibiotics ran. NSR on tele.     Personal items within reach, all safety measures maintained.         Problem: GASTROINTESTINAL - ADULT  Goal: Maintains or returns to baseline bowel function  Description: INTERVENTIONS:  - Assess bowel function  - Maintain adequate hydration with IV or PO as ordered and tolerated  - Evaluate effectiveness of GI medications  - Encourage mobilization and activity  - Obtain nutritional consult as needed  - Establish a toileting routine/schedule  - Consider collaborating with pharmacy to review patient's medication profile  Outcome: Progressing     Problem: SKIN/TISSUE INTEGRITY - ADULT  Goal: Skin integrity remains intact  Description: INTERVENTIONS  - Assess and document risk factors for pressure ulcer development  - Assess and document skin integrity  - Monitor for areas of redness and/or skin breakdown  - Initiate interventions, skin care algorithm/standards of care as needed  Outcome: Progressing  Goal: Incision(s), wounds(s) or drain site(s) healing without S/S of infection  Description: INTERVENTIONS:  - Assess and document risk factors for pressure ulcer development  - Assess and document skin integrity  - Assess and document dressing/incision, wound bed, drain sites and surrounding tissue  - Implement wound care per orders  - Initiate isolation precautions as appropriate  - Initiate Pressure Ulcer prevention bundle as indicated  Outcome: Progressing  Goal: Oral mucous membranes remain intact  Description: INTERVENTIONS  - Assess oral mucosa and hygiene practices  - Implement preventative oral hygiene regimen  - Implement oral medicated treatments as  ordered  Outcome: Progressing     Problem: NEUROLOGICAL - ADULT  Goal: Achieves stable or improved neurological status  Description: INTERVENTIONS  - Assess for and report changes in neurological status  - Initiate measures to prevent increased intracranial pressure  - Maintain blood pressure and fluid volume within ordered parameters to optimize cerebral perfusion and minimize risk of hemorrhage  - Monitor temperature, glucose, and sodium. Initiate appropriate interventions as ordered  Outcome: Progressing  Goal: Absence of seizures  Description: INTERVENTIONS  - Monitor for seizure activity  - Administer anti-seizure medications as ordered  - Monitor neurological status  Outcome: Progressing  Goal: Remains free of injury related to seizure activity  Description: INTERVENTIONS:  - Maintain airway, patient safety  and administer oxygen as ordered  - Monitor patient for seizure activity, document and report duration and description of seizure to MD/LIP  - If seizure occurs, turn patient to side and suction secretions as needed  - Reorient patient post seizure  - Seizure pads on all 4 side rails  - Instruct patient/family to notify RN of any seizure activity  - Instruct patient/family to call for assistance with activity based on assessment  Outcome: Progressing  Goal: Achieves maximal functionality and self care  Description: INTERVENTIONS  - Monitor swallowing and airway patency with patient fatigue and changes in neurological status  - Encourage and assist patient to increase activity and self care with guidance from PT/OT  - Encourage visually impaired, hearing impaired and aphasic patients to use assistive/communication devices  Outcome: Progressing     Problem: Impaired Cognition  Goal: Patient will exhibit improved attention, thought processing and/or memory  Description: Interventions:  - Encourage use of hearing aids, minimize distrations  Outcome: Progressing

## 2024-03-25 NOTE — PROGRESS NOTES
[facility] PULMONARY/[facility]ITICAL [facility]ARE    S: No acute events overnight.    Meds:   piperacillin-tazobactam  3.375 g Intravenous Q8H    escitalopram  5 mg Oral Daily    finasteride  5 mg Oral Daily    furosemide  40 mg Oral BID (Diuretic)    pantoprazole  20 mg Oral QAM AC       Prn Meds:  ipratropium-albuterol, acetaminophen, melatonin, guaiFENesin ER, benzonatate, glycerin-hypromellose-, sodium chloride, ondansetron, metoclopramide, polyethylene glycol (PEG 3350), sennosides, bisacodyl, fleet enema    Infusions:   dextrose 83 mL/hr at 03/25/24 0811       OBJECTIVE:  Vitals:    03/24/24 1600 03/24/24 2000 03/25/24 0000 03/25/24 0400   BP: 134/51 121/42 121/52 121/38   Pulse: 76 77 62 56   Resp: 20 20 20 19   Temp: 98.2 °F (36.8 °C) 97.9 °F (36.6 °C) 97.8 °F (36.6 °C) 97.8 °F (36.6 °C)   TempSrc: Oral Oral Oral Oral   SpO2: 99% 93% 92% 97%   Weight:           Oxygen Therapy  SpO2: 97 %  O2 Device: None (Room air)  O2 Flow Rate (L/min): 0 L/min  Pulse Oximetry Type: Intermittent  Oximetry Probe Site Changed: Yes  Pulse Ox Probe Location: Right hand           I/O last 3 completed shifts:  In: 580 [P.O.:480; IV PIGGYBACK:100]  Out: 2400 [Urine:2400]  No intake/output data recorded.    Lungs: diminished breath sounds base - right  Heart: regular rate and rhythm  Abdomen: soft, non-tender; bowel sounds normal; no masses,  no organomegaly  Extremities: extremities normal, atraumatic, no cyanosis or edema    Labs:  Recent Labs   Lab 03/23/24  0725 03/24/24  0528   RBC 4.07 3.84   HGB 9.2* 8.9*   HCT 31.7* 28.5*   MCV 77.9* 74.2*   MCH 22.6* 23.2*   MCHC 29.0* 31.2   RDW 20.2 20.4   NEPRELIM 7.79* 7.56   WBC 11.0 10.1   .0 234.0     Recent Labs   Lab 03/23/24  0725 03/24/24  0528   GLU 96 100*   BUN 30* 32*   CREATSERUM 1.44* 1.25   CA 8.5 8.0*   ALB 2.7* 2.3*   * 149*   K 3.5 3.5   * 120*   CO2 27.0 26.0   ALKPHO 85 74   AST 24 13*   ALT 17 13*   BILT 0.5 0.4   TP 6.8 6.1*     No results for input(s): \"PCT\"  in the last 168 hours.  Recent Labs     03/23/24  0725   CK 53     Lab Results   Component Value Date    COVID19 Not Detected 03/23/2024     No results for input(s): \"ABGPHT\", \"PHPSNJ3K\", \"MJCES2Y\", \"ABGHCO3\", \"LM\", \"FIO2\" in the last 72 hours.  Recent Labs     03/23/24  1022   LACTI 1.2       Lab Results   Component Value Date    INR 1.20 03/23/2024    INR 1.30 (H) 05/29/2023    INR 1.23 (H) 05/28/2023        Recent Labs   Lab 03/23/24  0725   CK 53       Imaging -- reviewed and visualized  CXR -- large R effusion.  Assessment and Plan    R sided pleural effusion: large, presumed to be cardiac in origin though has not yet been sampled.   - given stable neuro status plan for thoracentesis   - effusion present since at least 10/2023, may end up with trapped lung.  - echo with EF 35-40%, pulm HTN and moderate to severe aortic stenosis.    Subdural hematoma: suggested on ct head   - seen by neurosurgery; observation for now  - repeat ct stable  - per neurosurg     Veto Melgoza M.D.  Pulmonary/Critical Care and Sleep Medicine

## 2024-03-25 NOTE — SLP NOTE
SPEECH DAILY NOTE - INPATIENT    ASSESSMENT & PLAN   ASSESSMENT  Pt seen for dysphagia tx to reassess swallow fxn, determine plan of care, safest/least restrictive diet and provide pt/family education. Pt found sitting up in bedside chair with family member present in room. Pt able to follow required commands given increased vocal volume by speaker. Intermittent coughing still reported by family and noted during po trials of thin liquids via sip from cup. Discussed video swallow study protocol and rationale with patient to agreed to proceed. Will complete in the am if patient remains agreeable. Continue present diet utilizing strict aspiration precautions.     Diet Recommendations - Solids: Soft/ Easy to chew  Diet Recommendations - Liquids: Thin Liquids    Compensatory Strategies Recommended: Slow rate;Small bites and sips;Multiple swallows;No straws;Extra sauce/gravy;Alternate consistencies  Aspiration Precautions: Upright position;Slow rate;Small bites and sips;Cueing to swallow  Medication Administration Recommendations: One pill at a time    Patient Experiencing Pain: No                Treatment Plan  Treatment Plan/Recommendations: Videofluoroscopic swallow study    Interdisciplinary Communication:  family            GOALS  Goal #1 Complete video swallow study - goals pending results   To be completed                                        FOLLOW UP  Follow Up Needed (Documentation Required): Yes  SLP Follow-up Date: 03/26/24  Number of Visits to Meet Established Goals: 2    Session: 1    If you have any questions, please contact Serene GUILLORY SLP    Serene Rivera MA, CCC-SLP  Pager [phone]

## 2024-03-25 NOTE — PROGRESS NOTES
[facility]   part of [facility]     Hospitalist Progress Note     David Loyola Patient Status:  Inpatient    3/22/1933 MRN [id number]   [facility] 7NE-A Attending Anand Villalta MD   Hosp Day # 2 PCP Js Espana DO     Chief Complaint:   Chief Complaint   Patient presents with    Altered Mental Status    Fall       Subjective:     Patient denies any complaints     Objective:    Review of Systems:   4 point ROS completed and was negative, except for pertinent positive and negatives stated in subjective.    Vital signs:  Temp:  [97.8 °F (36.6 °C)-98.2 °F (36.8 °C)] 97.8 °F (36.6 °C)  Pulse:  [56-84] 56  Resp:  [18-20] 19  BP: (106-134)/(38-60) 121/38  SpO2:  [92 %-99 %] 97 %    Physical Exam:    General: No acute distress. Some confusion  Respiratory: Clear to auscultation bilaterally. No wheezes. No rhonchi.  Cardiovascular: S1, S2. Regular rate and rhythm. No murmurs.  Abdomen: Soft, nontender, nondistended.    Extremities: No edema.    Diagnostic Data:    Labs:  Recent Labs   Lab 24  0725 24  0528   WBC 11.0 10.1   HGB 9.2* 8.9*   MCV 77.9* 74.2*   .0 234.0   INR 1.20  --        Recent Labs   Lab 24  0725 24  0528   GLU 96 100*   BUN 30* 32*   CREATSERUM 1.44* 1.25   CA 8.5 8.0*   ALB 2.7* 2.3*   * 149*   K 3.5 3.5   * 120*   CO2 27.0 26.0   ALKPHO 85 74   AST 24 13*   ALT 17 13*   BILT 0.5 0.4   TP 6.8 6.1*       Estimated Creatinine Clearance: 37 mL/min (based on SCr of 1.25 mg/dL).    Recent Labs   Lab 24  0725   PTP 15.3*   INR 1.20            COVID-19 Lab Results    COVID-19  Lab Results   Component Value Date    COVID19 Not Detected 2024    COVID19 Not Detected 10/14/2023       Pro-Calcitonin  No results for input(s): \"PCT\" in the last 168 hours.    Cardiac  No results for input(s): \"TROP\", \"PBNP\" in the last 168 hours.    Creatinine Kinase  Recent Labs   Lab 24  0725   CK 53       Inflammatory Markers  No results  for input(s): \"CRP\", \"URIEL\", \"LDH\", \"DDIMER\" in the last 168 hours.    Recent Labs   Lab 03/23/24  0725   TROPHS 14   CK 53       Imaging: Imaging data reviewed in Epic.    Medications:    piperacillin-tazobactam  3.375 g Intravenous Q8H    escitalopram  5 mg Oral Daily    finasteride  5 mg Oral Daily    furosemide  40 mg Oral BID (Diuretic)    pantoprazole  20 mg Oral QAM AC       Assessment & Plan:      #Mechanical fall  -CT head reviewed  -PT OT appreciated     #Small extra-axial hemorrhage, subdural versus epidural vs calcified meningioma  -CT head reviewed  -no surgical intervention; hold anticoag; repeat CT head 1 month OP per neurosurgery     #CHF EF  35-40% with severe aortic regurg and moderate TR-will ask cards to consult for GDMT    #Large right pleural effusion  -will d/w pulm to day; probably needs thora today     #UTI-pseudomonas prelim on culture; zosyn ongoing    #Hypernatremia-d5w started today    #Diarrhea  -Ongoing for months  -C diff negative  -Stool PCR negative  -Will consider GI consult if no improvement      #RLE edema  -Chronically swollen but appears more swollen per family  -RLE US negative for DVT     #History of urinary retention  -needs luis changed if hasn't been done yet during this stay. Discussed with RN     #GERD  -PPI     Plan of care discussed with patient and RN    Anand Uribe MD    Supplementary Documentation:     Quality:  DVT Prophylaxis: scds; avoid chemical prophylaxis, given possible intracranial bleed  CODE status: see chart  Luis: no  Central line: no      Estimated date of discharge: tbd  At this point Mr. Loyola is expected to be discharge to: tbd

## 2024-03-25 NOTE — CONGREGATE LIVING REVIEW
[facility] Living Authorization    The [facility] Review Committee has reviewed this case and the patient IS APPROVED for discharge to a facility for Short Term Skilled once the following procedure is followed:     - The physician discharge instructions (contained within the BIBI note for SNF) must inlcude the below appropriate and approved COVID instructions to the facility    For questions regarding CLRC approval process, please contact the CM assigned to the case.  For questions regarding RN discharge workflow, please contact the unit Clinical Leader.

## 2024-03-25 NOTE — CONSULTS
[facility]    PATIENT'S NAME: NAVYA CASAS   ATTENDING PHYSICIAN: Anand Villalta M.D.   CONSULTING PHYSICIAN: Remy Yan M.D.   PATIENT ACCOUNT#:   [id number]    LOCATION:  [address] [place] [address]  MEDICAL RECORD #:   [id number]       YOB: 1933  ADMISSION DATE:       03/23/2024      CONSULT DATE:  03/25/2024    REPORT OF CONSULTATION    HISTORY OF PRESENT ILLNESS:  A 89-year-old male with history of chronic Pat, had been admitted here before for UTIs.  The patient had an unwitnessed fall.  He was found to have subdural hematoma.  On admission, the patient was found to have a pleural effusion and UTI.  He was started on antibiotics.  Overall, he is doing better.      The patient's echo showed ejection fraction 35% to 40% with moderate to severe aortic insufficiency.  He had a STANISLAV at [facility] in 2023 with normal ejection fraction and a flail chord of the posterior leaflet and moderate aortic insufficiency.  The patient denies any history of syncope.  He does have a chronic Pat.    PAST MEDICAL HISTORY:  Chronic Pat, aortic insufficiency, hypertension, dyslipidemia.    MEDICATIONS:  As listed.    ALLERGIES:  None.    SOCIAL HISTORY:  The patient moved from [place].  He was a former  in that area.  He moved to be near family.    REVIEW OF SYSTEMS:  Denies hemoptysis.  Denies hematemesis.  Denies hematuria.  Rest of review of systems negative except as in HPI.      PHYSICAL EXAMINATION:    VITAL SIGNS:  Blood pressure 130/60, pulse 70.  HEENT:  Pupils equal and reactive to light and accommodation.  LUNGS:  Decreased right base.  HEART:  S1, S2.  A 2/4 diastolic blow.  There is a systolic murmur.  ABDOMEN:  Soft.  EXTREMITIES:  No clubbing, cyanosis, or edema.   NEUROLOGIC:  Grossly intact.    IMPRESSION:    1.   LV dysfunction.  Patient with LV dysfunction and pleural effusion, most likely heart failure.  The patient will undergo thoracentesis.  The patient had ejection  fraction of 35% to 40% which is a drop from what he was at [facility].  He has moderate to severe aortic insufficiency and at least mild to moderate mitral regurgitation with a torn chord by STANISLAV at [facility].  At the present time, I would continue with Lasix.  I would add losartan.  I do not see a need for guideline-directed medical therapy at his age and discussed this with him and his daughter.  2.   Chronic Pat.  On antibiotics for UTI.  3.   Subdural hematoma.  Would treat conservatively.  Will monitor.  4.   Hypertension.  5.   Dyslipidemia.    Dictated By Remy Yan M.D.  d: 03/25/2024 13:30:59  t: 03/25/2024 13:47:09  [facility] [id number]  MJG/

## 2024-03-25 NOTE — OCCUPATIONAL THERAPY NOTE
OCCUPATIONAL THERAPY EVALUATION - INPATIENT     Room Number: 7611/7611-A  Evaluation Date: 3/25/2024  Type of Evaluation: Initial  Presenting Problem: AMS, UTI    Physician Order: IP Consult to Occupational Therapy  Reason for Therapy: ADL/IADL Dysfunction and Discharge Planning    OCCUPATIONAL THERAPY ASSESSMENT   Patient is currently functioning below baseline with toileting, upper body dressing, lower body dressing, grooming, bed mobility, transfers, stating sitting balance, dynamic sitting balance, static standing balance, and dynamic standing balance. Prior to admission, patient's baseline is CGA for amb and t/fs and min A x1 person for higher level ADLs but CGA for BADLs.  Patient is requiring moderate assist as a result of the following impairments: decreased functional strength, decreased functional reach, decreased endurance, impaired coordination, impaired motor planning, decreased insight to deficits, and decreased safety awareness. Occupational Therapy will continue to follow for duration of hospitalization.    Patient will benefit from continued skilled OT Services to promote return to prior level of function and safety with continuous assistance and gradual rehabilitative therapy       History Related to Current Admission: Patient is a 89 year old male admitted on 3/23/2024 with Presenting Problem: AMS, UTI. Co-Morbidities : HTN, BPH, chronic luis d/t urinary retention, R chronic torn glut med    WEIGHT BEARING RESTRICTION  Weight Bearing Restriction: None                Recommendations for nursing staff:   Transfers: Mod A  Toileting location: toilet    EVALUATION SESSION:  Patient Start of Session: supine in bed for session  FUNCTIONAL TRANSFER ASSESSMENT  Sit to Stand: Edge of Bed  Edge of Bed: Minimal Assist (min on first attempt after using bathroom getting up from EOB reuqired mod A to t/f to chair)  Toilet Transfer: Moderate Assist (from low toilet with grab bars)    BED MOBILITY  Rolling:  Contact Guard Assist  Supine to Sit : Minimal Assist    BALANCE ASSESSMENT  Static Sitting: Contact Guard Assist  Sitting Bilateral: Contact Guard Assist  Static Standing: Minimal Assist  Standing Bilateral: Moderate Assist (to take steps in room from bed to toilet and back)    FUNCTIONAL ADL ASSESSMENT  Grooming Standing: Minimal Assist (at sink to wash hands and face)  UB Dressing Seated: Minimal Assist (to baron new robe)  LB Dressing Seated: Moderate Assist (to baron new brief, mod A in standing to pull up brief)  Toileting Seated: Moderate Assist (Mod A for agata-care and brief management)      ACTIVITY TOLERANCE: vital stable                         O2 SATURATIONS       COGNITION  Overall Cognitive Status:  Impaired and impulsive and forgetful    Upper Extremity   ROM: within functional limits   Strength: within functional limits   Coordination  Gross motor: WNl  Fine motor: WNL  Sensation: Light touch:  intact    EDUCATION PROVIDED  Patient: Role of Occupational Therapy; Plan of Care  Patient's Response to Education: Verbalized Understanding; Returned Demonstration    Equipment used: RW  Demonstrates functional use, Would benefit from additional trial      Therapist comments: Pt reported fatigue at home, pt educated on work simplification and energy conservation for at home to assist with independence with fatigue at home.    Patient End of Session: Up in chair;Needs met;Call light within reach;All patient questions and concerns addressed;SCDs in place;Alarm set;Family present    OCCUPATIONAL PROFILE    HOME SITUATION  Type of Home: Assisted living facility ([facility])  Home Layout: One level  Lives With: Spouse;Caregiver part-time    Toilet and Equipment: Comfort height toilet  Shower/Tub and Equipment: Walk-in shower       Occupation/Status: retired  Hand Dominance: Right  Drives: No  Patient Regularly Uses: Reading glasses    Prior Level of Function: Prior to admission, patient's baseline is CGA for amb and  t/fs and min A x1 person for higher level ADLs but CGA for BADLs.    SUBJECTIVE   Pt stated, \"I am doing well.\"    PAIN ASSESSMENT  Ratin  Location: no pain at this time       OBJECTIVE  Precautions: Bed/chair alarm;Restraints;Hard of hearing;Other (Comment) (chair alarm and seatbelt; hears from L side better)  Fall Risk: High fall risk      ASSESSMENTS    AM-PAC ‘6-Clicks’ Inpatient Daily Activity Short Form  -   Putting on and taking off regular lower body clothing?: A Lot  -   Bathing (including washing, rinsing, drying)?: A Lot  -   Toileting, which includes using toilet, bedpan or urinal? : A Lot  -   Putting on and taking off regular upper body clothing?: A Little  -   Taking care of personal grooming such as brushing teeth?: A Little  -   Eating meals?: A Little    AM-PAC Score:  Score: 15  Approx Degree of Impairment: 56.46%  Standardized Score (AM-PAC Scale): 34.69    ADDITIONAL TESTS     NEUROLOGICAL FINDINGS      COGNITION ASSESSMENTS       PLAN  OT Treatment Plan: Balance activities;Energy conservation/work simplification techniques;ADL training;IADL training;Continued evaluation;Compensatory technique education;Equipment eval/education;Neuromuscluar reeducation;Patient/Family education;Cognitive reorientation;Endurance training;UE strengthening/ROM;Functional transfer training  Rehab Potential : Good  Frequency: 3-5x/week  Number of Visits to Meet Established Goals: 5    ADL Goals   Patient will perform upper body dressing:  with supervision  Patient will perform lower body dressing:  with supervision  Patient will perform toileting: with supervision    Functional Transfer Goals  Patient will transfer from supine to sit:  with supervision  Patient will transfer from sit to stand:  with supervision  Patient will transfer to toilet:  with supervision    UE Exercise Program Goal  Patient will be supervision with bilateral AROM HEP (home exercise program).    Additional Goals:  Pt will verbalize at  least 3 energy conservation techniques  Pt will stand at sink for 5 minutes to complete grooming routine    Patient Evaluation Complexity Level:   Occupational Profile/Medical History MODERATE - Expanded review of history including review of medical or therapy record   Specific performance deficits impacting engagement in ADL/IADL MODERATE  3 - 5 performance deficits   Client Assessment/Performance Deficits MODERATE - Comorbidities and min to mod modifications of tasks    Clinical Decision Making MODERATE - Analysis of occupational profile, detailed assessments, several treatment options    Overall Complexity MODERATE     OT Session Time: 35 minutes  Self-Care Home Management: 25 minutes  Therapeutic Activity: 0 minutes  Neuromuscular Re-education: 0 minutes  Therapeutic Exercise: 0 minutes  Cognitive Skills: 0 minutes  Sensory Integrative: 0 minutes  Orthotic Management and Trainin minutes  Can add/delete any of these

## 2024-03-25 NOTE — CM/SW NOTE
Pt admits from [facility]. Per PT notes, pt has been requiring increased assistance with ADLs. Anticipated therapy need: Gradual Rehabilitative Therapy, pt referred to [facility] who is in agreement of recommendations. Referrals initiated, PASRR screen complete/uploaded. Will need insurance auth if DC to [facility].     JASMIN spoke with INDERJIT Santacruz at [facility] ([phone]). Faxed clinical updates to nurses station at [phone]. Will need approval to return to [facility] setting.     JASMIN to remain available, will continue to send updates to [place] for DC planning purposes. [facility] referrals sent if unable to DC back to [facility].     KIMBERLY Luna

## 2024-03-25 NOTE — PAYOR COMM NOTE
--------------  ADMISSION REVIEW     Payor: [employer]  Subscriber #:  [id number]  Authorization Number: [id number]    Admit date: 3/23/24  Admit time: 11:43 AM       REVIEW DOCUMENTATION:     ED Provider Notes          Patient Seen in: [facility] Emergency Department      History     Chief Complaint   Patient presents with    Altered Mental Status    Fall     Stated Complaint: fall, ams    Subjective:   HPI    89-year-old male brought in after an unwitnessed fall.  Found on the floor by his wife.  Unknown time of fall.  Arrives with c-collar in place.  Bruising to the left side of the head.  Patient is altered and cannot provide no useful history.  Patient's son later arrived and reports that he was evaluated here a day ago and had a chest x-ray that showed a pleural effusion.  He has also been on diuretics which do not seem to be helping with his edema.  He recently was on Macrobid for UTI.  Family reports that the increased swelling in the right leg is chronic and unchanged.  Patient's son is a physician and reports that the patient's condition has been decreasing as of late.  He was previously ambulating with a walker but is now requiring maximal assist.    Objective:   Past Medical History:   Diagnosis Date    Essential hypertension     Pat catheter in place     H/O transurethral resection of prostate     Hypercholesterolemia     Neuropathy     Urinary retention    Review of Systems    Positive for stated complaint: fall, ams  Other systems are as noted in HPI.  Constitutional and vital signs reviewed.      All other systems reviewed and negative except as noted above.    Physical Exam     ED Triage Vitals [03/23/24 0720]   /74   Pulse 76   Resp 18   Temp 97.3 °F (36.3 °C)   Temp src Temporal   SpO2 97 %   O2 Device None (Room air)       Current:/61   Pulse 75   Temp 97.3 °F (36.3 °C) (Temporal)   Resp 19   Wt 68 kg   SpO2 100%   BMI 20.34 kg/m²         Physical  Exam    General:  Vitals as listed.  Elderly and frail  HEENT: Contusion to left forehead.  No palpable skull fractures.  No lacerations.  Neck: supple, no rigidity   Lungs: good air exchange and clear   Heart: regular rate rhythm and no murmur   Abdomen: Soft and nontender.  No abdominal masses.  No peritoneal signs   Extremities: Edema in bilateral lower extremities that is much worse on the right.  Normal peripheral pulses   Neuro: Awake but does not respond to questioning or commands.  Skin: no rashes or nodules    ED Course     Labs Reviewed   URINALYSIS WITH CULTURE REFLEX - Abnormal; Notable for the following components:       Result Value    Clarity Urine Ex.Turbid (*)     Blood Urine 1+ (*)     Protein Urine 100 (*)     Nitrite Urine 2+ (*)     Leukocyte Esterase Urine 500 (*)     WBC Urine >50 (*)     RBC Urine >10 (*)     Bacteria Urine 3+ (*)     All other components within normal limits   COMP METABOLIC PANEL (14) - Abnormal; Notable for the following components:    Sodium 148 (*)     Chloride 116 (*)     BUN 30 (*)     Creatinine 1.44 (*)     Calculated Osmolality 312 (*)     eGFR-Cr 46 (*)     Albumin 2.7 (*)     A/G Ratio 0.7 (*)     All other components within normal limits   CBC W/ DIFFERENTIAL - Abnormal; Notable for the following components:    HGB 9.2 (*)     HCT 31.7 (*)     MCV 77.9 (*)     MCH 22.6 (*)     MCHC 29.0 (*)     Neutrophil Absolute Prelim 7.79 (*)     Neutrophil Absolute 7.79 (*)     All other components within normal limits   TROPONIN I HIGH SENSITIVITY - Normal   CK CREATINE KINASE (NOT CREATININE) - Normal   POCT GLUCOSE - Normal   CBC WITH DIFFERENTIAL WITH PLATELET    Narrative:     The following orders were created for panel order CBC With Differential With Platelet.  Procedure                               Abnormality         Status                     ---------                               -----------         ------                     CBC W/ DIFFERENTIAL[[id number]]           Abnormal            Final result                 Please view results for these tests on the individual orders.   LACTIC ACID, PLASMA   RAINBOW DRAW LAVENDER   RAINBOW DRAW LIGHT GREEN   RAINBOW DRAW BLUE   SARS-COV-2/FLU A AND B/RSV BY PCR (GENEXPERT)   URINE CULTURE, ROUTINE   BLOOD CULTURE   BLOOD CULTURE     EKG    Rate, intervals and axes as noted on EKG Report.  Rate: 81  Rhythm: Sinus Rhythm  Reading: First-degree block.  No ST elevation MI       XR CHEST AP PORTABLE  (CPT=71045)    Result Date: 3/23/2024  PROCEDURE:  XR CHEST AP PORTABLE  (CPT=71045)  TECHNIQUE:  AP chest radiograph was obtained.  COMPARISON:  [facility] , XR, XR CHEST PA + LAT CHEST (CPT=71046), 3/22/2024, 3:25 PM.  INDICATIONS:  fall, ams  PATIENT STATED HISTORY: (As transcribed by Technologist)  Patient states he fell this morning.    FINDINGS:  Large right pleural effusion has increased in size from the prior examination.  Underlying consolidation cannot be excluded.  The left lung is clear.  No left effusion.  No pneumothorax.  Cardiac silhouette is within normal limits.            CONCLUSION:  Large right pleural effusion appears mildly increased in size from prior exam.   LOCATION:  [facility]      Dictated by (CST): Leo Martínez MD on 3/23/2024 at 8:58 AM     Finalized by (CST): Leo Martínez MD on 3/23/2024 at 8:58 AM       CT SPINE CERVICAL (CPT=72125)    Result Date: 3/23/2024  PROCEDURE:  CT SPINE CERVICAL (CPT=72125)  COMPARISON:  [facility] , CT, CT CHEST (CPT=71250), 10/15/2023, 9:22 PM.  [facility] , XR, XR CHEST PA + LAT CHEST (CPT=71046), 3/22/2024, 3:25 PM.  INDICATIONS:  fall, amsNeck pain after injury.  TECHNIQUE:  Noncontrast CT scanning of the cervical spine is performed from the skull base through C7.  Multiplanar reconstructions are generated.  Dose reduction techniques were used. Dose information is transmitted to the ACR (American College of Radiology) NRDR (National Radiology Data Registry) which includes the Dose Index  Registry.  PATIENT STATED HISTORY: (As transcribed by Technologist)  Patient is here for AMS and unwitnessed fall, bruising noted to left side of head.    FINDINGS:   CERVICAL SPINE: There is no evidence for cervical spine fracture, traumatic subluxation, prevertebral swelling, scoliosis or other malalignment.  FACETS:  Facet articulations show no acute offset, or acute appearing asymmetry.  CRANIOCERVICAL JUNCTION:  The craniocervical junction appears preserved. The C1-C2 junction appears preserved.  DEGENERATIVE CHANGES:  There are degenerative changes in the spine.  LIMITED UPPER CHEST:  Partially seen opacification of the right apex; a very recent chest x-ray showed subtotal opacification of the right chest, and the scanogram shows very little aeration of the visible right chest, with some aeration of the medial right suprahilar region.  This can be followed up with any appropriate needed chest imaging.            CONCLUSION:  Degenerative changes involving the cervical spine, but no fracture or subluxation seen.  Partially seen subtotal opacification of the right chest, with only minimal aeration.  Consistent with large right pleural effusion and lung atelectasis, potential malignant effusion with underlying lung malignancy on the right not excluded.    LOCATION:  [facility]   Dictated by (CST): Ricky Scott MD on 3/23/2024 at 8:09 AM     Finalized by (CST): Ricky Scott MD on 3/23/2024 at 8:12 AM       CT BRAIN OR HEAD (93585)    Result Date: 3/23/2024  PROCEDURE:  CT BRAIN OR HEAD (23902)  COMPARISON:  None.  INDICATIONS:  fall, ams undo is fall bruising left side of the head altered mental status  TECHNIQUE:  Noncontrast CT scanning is performed through the brain. Dose reduction techniques were used. Dose information is transmitted to the ACR (American College of Radiology) NRDR (National Radiology Data Registry) which includes the Dose Index Registry.  PATIENT STATED HISTORY: (As transcribed by  Technologist)  Patient is here for AMS and unwitnessed fall, bruising noted to left side of head.    FINDINGS:   Focal extra-axial hyperdensity lateral aspect of the right frontal lobe discoid in shape 16 x 19 x 5 mm AP cephalocaudal and transverse dimensions respectively.  The hyperdensity somewhat heterogeneous.  No other areas of similar attenuation.  Cerebral atrophy with prominence of the ventricles and sulci, along with cerebellar atrophy.  Moderate chronic ischemic white matter disease.  No CT features indicating acute territorial infarct.  No herniation seen.  No skull fracture, acute sinusitis, or mastoiditis.            CONCLUSION:  Focal extra-axial hyperdensity right frontal lobe 16 x 19 x 5 mm.  Differential includes a small extra-axial hemorrhage, such as subdural or epidural hemorrhage, versus a plaque-like discoid meningioma.  No midline shift, mass effect, herniation hydrocephalus.  Suggest CT follow-up.     LOCATION:  [facility]   Dictated by (CST): Ricky Scott MD on 3/23/2024 at 8:05 AM     Finalized by (CST): Ricky Scott MD on 3/23/2024 at 8:09 AM               MDM      89-year-old male presents after an unwitnessed fall.  Bruising to the left forehead and temporal region.  Altered mental status.  Chronic indwelling Pat with purulent appearing urine.    Additional history obtained by patient's son who reports that a chest x-ray performed 1 day ago showed a large right-sided pleural effusion.  He was also recently on antibiotics for UTI.  Has been progressively deteriorating    Differential includes but is not limited to UTI, pneumonia, dehydration, metabolic disturbance, intracranial hemorrhage, a life threat.    CBC, CMP, CT brain, CT cervical spine, urinalysis, blood culture and lactic acid, cardiac enzymes, viral nasal swab ordered for further evaluation.    Laboratory evaluation shows mild GINETTE with an elevated BUN and creatinine.  Patient's son reports he was recently started on Lasix.   A small fluid bolus of 250 mL was ordered.  Urinalysis returned with evidence of UTI.  Culture from last year showed Pseudomonas.  Zosyn ordered.    My independent interpretation of chest x-ray is that there is a large pleural effusion on the right side.    Radiology reports a 16 x 19 x 5 mm area in the right frontal lobe region that could represent a subdural or epidural hemorrhage.  Viewed with neurosurgery who will monitor.  Discussed case with admitting physician who request pulmonary consult for pleural effusion.  Case was discussed with Dr. Crandall who will follow.  No hypoxemia or respiratory distress at this time.  Neurosurgery feels the patient is okay for the floors at this time.      Critical CARE time:  A total of 40 minutes of critical care time (exclusive of billable procedures) was administered to manage the patient's critical imaging findings due to his right-sided subdural hematoma with altered mental status.  This involved direct patient intervention, complex decision making, and/or extensive discussions with the patient, family, and clinical staff.        Admission disposition: 3/23/2024 10:00 AM      Medical Decision Making      Disposition and Plan     Clinical Impression:  1. Altered mental status, unspecified altered mental status type    2. GINETTE (acute kidney injury) (HCC)    3. Acute subdural hematoma (HCC)    4. Urinary tract infection without hematuria, site unspecified    5. Pleural effusion         Disposition:  Admit  3/23/2024 10:00 am    Hospital Problems       Present on Admission             ICD-10-CM Noted POA    * (Principal) Altered mental status, unspecified altered mental status type R41.82 3/23/2024 Unknown             Signed by Emmett Bolanos MD on 3/23/2024 10:12 AM         [facility]  History and Physical         Chief Complaint:  fall     Subjective:    History of Present Illness:      David Loyola is a 89 year old male with past medical history of hypertension,  hyperlipidemia, urinary retention with chronic Pat who presents ED for unwitnessed fall.  Patient's wife found patient on floor.  Time of fall is unknown.  Patient's family reports patient a day ago that showed pleural effusion.  He has been on diuretics which has not seemed to help.  He has also been on had chest x-ray Macrobid for UTI.  Patient's family reports he has had worsening confusion and has not seem like himself.  Family reports patient has chronic swelling in right leg that has not changed.  Patient used to be able to ambulate with walker but is now requiring maximal assistance.    Assessment & Plan:       #Mechanical fall  -CT head reviewed  -CT C-spine reviewed  -PT OT consulted     #Small extra-axial hemorrhage, subdural versus epidural  -CT head reviewed  -Plan for repeat CT head tomorrow  -Neurosurgery following     #Large right pleural effusion  -Chest x-ray reviewed  -Remains on room air  -Echo ordered  -Plan for thoracentesis Monday, possibly sooner if respiratory status declines  -Pulmonology following     #UTI  -Blood cultures in lab  -Urine culture in lab  -IV antibiotics     #Hypernatremia  #GINETTE  -Likely in the setting of dehydration  -IV fluids     #History of urinary retention  -Pat in place prior to arrival     #GERD  -PPI            Plan of care discussed with patient     Pao Bailey, DO        PROCEDURE  Procedures signed by Veto Melgoza MD at 3/25/2024 10:32 AM    Author: Veto Melgoza MD Service: Pulmonology Author Type: Physician   Filed: 3/25/2024 10:32 AM Date of Service: 3/25/2024 10:28 AM Status: Signed   : Veto Melgoza MD (Physician)   Pre-procedure Diagnoses   1. Pleural effusion [J90]     Post-procedure Diagnoses   1. Pleural effusion [J90]     Procedures   1. THORACENTESIS(BEDSIDE) [PRO8 (CUSTOM)]       [facility]        Thoracentesis Procedure Note     Consent: Informed consent was obtained. Risks of the procedure were discussed including:  infection, bleeding, pain, pneumothorax.     Under sterile conditions the patient was positioned. chlorhexidine solution and sterile drapes were utilized.  US was used to petros effusion.  1% buffered lidocaine was used to anesthetize the rib space. Fluid was obtained without any difficulties and minimal blood loss.  A dressing was applied to the wound and wound care instructions were provided.      Findings  1500 ml of clear nalini colored pleural fluid was obtained. A sample was sent to Pathology for cytogenetics, and cell counts, as well as for infection analysis.  Drainage was stopped at 1500cc due to coughing.     Complications:  None; patient tolerated the procedure well.            Condition: stable     A follow up chest x-ray was ordered.                      CONSULT  ASSESSMENT/PLAN:     R sided effusion: large, presumed to be cardiac in origin though has not yet been sampled.   - will eventually require thoracentesis for at least diagnostic sample   - effusion present since at least 10/2023  - once we are sure there is not an active neuro issue then can plan thora on Monday. Can perform sooner if resp status declines however pt is on room air and does not appear dyspneic.   - check echo   Subdural hematoma: suggested on ct head   - seen by neurosurgery  - check INR  - repeat ct tomorrow per neurosurg      Will follow           Olivia Crandall M.D.  [facility]  Pulmonary / Critical care  3/23/2024    3/24  Chief Complaint: Fall      Subjective:      Patient resting in bed. He is more alert compared to yesterday. He denies any fevers, chills, nausea, vomiting, abdominal pain, dyspnea      Objective:    Review of Systems:   A comprehensive review of systems was completed; pertinent positive and negatives stated in subjective.     Vital signs:  Temp:  [96.8 °F (36 °C)-98.8 °F (37.1 °C)] 98.2 °F (36.8 °C)  Pulse:  [54-86] 79  Resp:  [18-21] 18  BP: (106-139)/(52-99) 106/60  SpO2:  [91 %-99 %] 93 %      Physical Exam:    General: No acute distress  Respiratory: No wheezes, no rhonchi  Cardiovascular: S1, S2, regular rate and rhythm  Abdomen: Soft, Non-tender, non-distended, positive bowel sounds  Neuro: No new focal deficits.   Extremities: RLE edema      Diagnostic Data:    Labs:       Recent Labs   Lab 03/23/24  0725 03/24/24  0528   WBC 11.0 10.1   HGB 9.2* 8.9*   MCV 77.9* 74.2*   .0 234.0   INR 1.20  --               Recent Labs   Lab 03/23/24  0725 03/24/24  0528   GLU 96 100*   BUN 30* 32*   CREATSERUM 1.44* 1.25   CA 8.5 8.0*   ALB 2.7* 2.3*   * 149*   K 3.5 3.5   * 120*   CO2 27.0 26.0   ALKPHO 85 74   AST 24 13*   ALT 17 13*   BILT 0.5 0.4   TP 6.8 6.1*         Estimated Creatinine Clearance: 37 mL/min (based on SCr of 1.25 mg/dL).         Recent Labs   Lab 03/23/24  0725   TROPHS 14   CK 53             Recent Labs   Lab 03/23/24  0725   PTP 15.3*   INR 1.20                     Microbiology           Hospital Encounter on 03/23/24   1. Urine Culture, Routine     Status: Abnormal (Preliminary result)     Collection Time: 03/23/24  7:24 AM     Specimen: Urine, luis catheter   Result Value Ref Range     Urine Culture >100,000 CFU/ML Pseudomonas aeruginosa (A) N/A            Imaging: Reviewed in Epic.     Medications:    piperacillin-tazobactam  3.375 g Intravenous Q8H    escitalopram  5 mg Oral Daily    finasteride  5 mg Oral Daily    furosemide  40 mg Oral BID (Diuretic)    pantoprazole  20 mg Oral QAM AC         Assessment & Plan:       #Mechanical fall  -CT head reviewed  -CT C-spine reviewed  -PT OT consulted     #Small extra-axial hemorrhage, subdural versus epidural  -CT head reviewed  -Repeat CT head reviewed   -Neurosurgery following     #Large right pleural effusion  -Chest x-ray reviewed  -Remains on room air  -Echo ordered  -Plan for thoracentesis Monday, possibly sooner if respiratory status declines  -Pulmonology following     #UTI  -Blood cultures in lab  -Urine culture in  lab  -IV antibiotics     #Hypernatremia  #GINETTE, resolved   -Likely in the setting of dehydration  -IV fluids     #Diarrhea  -Ongoing for months  -C diff ordered  -Stool PCR ordered  -Will consider GI consult if no improvement      #RLE edema  -Chronically swollen but appears more swollen per family  -RLE US ordered      #History of urinary retention  -Pat in place prior to arrival     #GERD  -PPI         Pao Bailey, DO     3/25  [facility] PULMONARY/CRITICAL CARE     Meds:   piperacillin-tazobactam  3.375 g Intravenous Q8H    escitalopram  5 mg Oral Daily    finasteride  5 mg Oral Daily    furosemide  40 mg Oral BID (Diuretic)    pantoprazole  20 mg Oral QAM AC         Prn Meds:  ipratropium-albuterol, acetaminophen, melatonin, guaiFENesin ER, benzonatate, glycerin-hypromellose-, sodium chloride, ondansetron, metoclopramide, polyethylene glycol (PEG 3350), sennosides, bisacodyl, fleet enema     Infusions:   dextrose 83 mL/hr at 03/25/24 0811         OBJECTIVE:         Vitals:     03/24/24 1600 03/24/24 2000 03/25/24 0000 03/25/24 0400   BP: 134/51 121/42 121/52 121/38   Pulse: 76 77 62 56   Resp: 20 20 20 19   Temp: 98.2 °F (36.8 °C) 97.9 °F (36.6 °C) 97.8 °F (36.6 °C) 97.8 °F (36.6 °C)   TempSrc: Oral Oral Oral Oral   SpO2: 99% 93% 92% 97%   Weight:                 Oxygen Therapy  SpO2: 97 %  O2 Device: None (Room air)  O2 Flow Rate (L/min): 0 L/min  Pulse Oximetry Type: Intermittent  Oximetry Probe Site Changed: Yes  Pulse Ox Probe Location: Right hand             I/O last 3 completed shifts:  In: 580 [P.O.:480; IV PIGGYBACK:100]  Out: 2400 [Urine:2400]  No intake/output data recorded.     Lungs: diminished breath sounds base - right  Heart: regular rate and rhythm  Abdomen: soft, non-tender; bowel sounds normal; no masses,  no organomegaly  Extremities: extremities normal, atraumatic, no cyanosis or edema     Labs:       Recent Labs   Lab 03/23/24  0725 03/24/24  0528   RBC 4.07 3.84   HGB 9.2* 8.9*   HCT 31.7*  28.5*   MCV 77.9* 74.2*   MCH 22.6* 23.2*   MCHC 29.0* 31.2   RDW 20.2 20.4   NEPRELIM 7.79* 7.56   WBC 11.0 10.1   .0 234.0           Recent Labs   Lab 03/23/24  0725 03/24/24  0528   GLU 96 100*   BUN 30* 32*   CREATSERUM 1.44* 1.25   CA 8.5 8.0*   ALB 2.7* 2.3*   * 149*   K 3.5 3.5   * 120*   CO2 27.0 26.0   ALKPHO 85 74   AST 24 13*   ALT 17 13*   BILT 0.5 0.4   TP 6.8 6.1*      No results for input(s): \"PCT\" in the last 168 hours.      Recent Labs     03/23/24  0725   CK 53            Lab Results   Component Value Date     COVID19 Not Detected 03/23/2024      No results for input(s): \"ABGPHT\", \"FDKVFI4P\", \"CEHNC0L\", \"ABGHCO3\", \"LM\", \"FIO2\" in the last 72 hours.      Recent Labs     03/23/24  1022   LACTI 1.2               Lab Results   Component Value Date     INR 1.20 03/23/2024     INR 1.30 (H) 05/29/2023     INR 1.23 (H) 05/28/2023          Recent Labs   Lab 03/23/24  0725   CK 53         Imaging -- reviewed and visualized  CXR -- large R effusion.  Assessment and Plan     R sided pleural effusion: large, presumed to be cardiac in origin though has not yet been sampled.   - given stable neuro status plan for thoracentesis   - effusion present since at least 10/2023, may end up with trapped lung.  - echo with EF 35-40%, pulm HTN and moderate to severe aortic stenosis.    Subdural hematoma: suggested on ct head   - seen by neurosurgery; observation for now  - repeat ct stable  - per neurosurg      Veto Melgoza M.D.  Pulmonary/Critical Care and Sleep Medicine       MEDICATIONS ADMINISTERED IN LAST 1 DAY:  dextrose 5% infusion       Date Action Dose Route User    3/25/2024 0811 New Bag (none) Intravenous Dee Art, INDERJIT          escitalopram (Lexapro) tab 5 mg       Date Action Dose Route User    3/25/2024 0800 Given 5 mg Oral Dee Art RN          finasteride (Proscar) tab 5 mg       Date Action Dose Route User    3/25/2024 0800 Given 5 mg Oral Dee Art RN           furosemide (Lasix) tab 40 mg       Date Action Dose Route User    3/25/2024 0800 Given 40 mg Oral Dee Art RN    3/24/2024 1704 Given 40 mg Oral Lynette Sullivan RN          pantoprazole (Protonix) DR tab 20 mg       Date Action Dose Route User    3/25/2024 0800 Given 20 mg Oral Dee Art RN          piperacillin-tazobactam (Zosyn) 3.375 g in dextrose 5% 100 mL IVPB-ADDV       Date Action Dose Route User    3/25/2024 1114 New Bag 3.375 g Intravenous Dee Art RN    3/25/2024 0240 New Bag 3.375 g Intravenous Amie Marsh RN    3/24/2024 1712 New Bag 3.375 g Intravenous Lynette Sullivan RN            Vitals (last day)       Date/Time Temp Pulse Resp BP SpO2 Weight O2 Device O2 Flow Rate (L/min) [facility]    03/25/24 0800 97.6 °F (36.4 °C) 79 18 125/62 96 % -- None (Room air) -- SS    03/25/24 0400 97.8 °F (36.6 °C) 56 19 121/38 97 % -- -- -- OG    03/25/24 0000 97.8 °F (36.6 °C) 62 20 121/52 92 % -- -- -- OG    03/24/24 2000 97.9 °F (36.6 °C) 77 20 121/42 93 % -- -- -- OG    03/24/24 1600 98.2 °F (36.8 °C) 76 20 134/51 99 % -- None (Room air) -- CR    03/24/24 1515 -- 62 -- -- -- -- -- -- CR    03/24/24 1200 98.2 °F (36.8 °C) -- 18 117/51 99 % -- None (Room air) -- CR    03/24/24 0945 -- 79 -- -- -- -- -- -- CR    03/24/24 0800 98.2 °F (36.8 °C) 84 18 106/60 -- -- None (Room air) -- CR    03/24/24 0400 98.5 °F (36.9 °C) 76 20 119/68 93 % -- -- -- OG    03/24/24 0000 98.2 °F (36.8 °C) 73 20 112/60 91 % -- -- -- OG

## 2024-03-25 NOTE — CDS QUERY
DOCUMENTATION CLARIFICATION QUERY  Dear Dr. Villalta,    Please further specify the relationship between the Urinary tract infection (UTI) and Chronic indwelling luis catheter :   [  x ] UTI due to Chronic Indwelling Luis Catheter   [   ] UTI unrelated to Chronic Indwelling Luis Catheter   [   ] Other  (please specify) _________________    CLINICAL INDICATORS:   -3/23/24 ED: Chronic indwelling Luis with purulent appearing urine.  -3/23 H&P: UTI…#History of urinary retention  -Luis in place prior to arrival    -3/25 Hospitalist: #UTI-pseudomonas prelim on culture; zosyn ongoing    RISK FACTORS: Chronic indwelling luis catheter - History of urinary retention      TREATMENT-IV antibiotics - Plan for luis catheter change.                          Use of terms such as suspected, possible, or probable (associated with a specific diagnosis that is being evaluated, monitored, or treated as if it exists) are acceptable and can be coded in the inpatient setting, when documented at the time of discharge.     Please add any additional documentation to your progress note and continue to document this through discharge.  Mariposa Iglesias RN, BSN, CWOCN  [facility] Clinical   [phone]                                                                                    THIS FORM IS A PART OF THE PERMANENT MEDICAL RECORD

## 2024-03-25 NOTE — DIETARY NOTE
[facility]   part of [facility]    NUTRITION ASSESSMENT    Pt does not meet malnutrition criteria at this time.    NUTRITION INTERVENTION:    Meal and Snacks - Monitor and encourage adequate PO intake.   Medical Food Supplements - Ensure Plus High Protein Daily. Rationale/use for oral supplements discussed.  Vitamin and Mineral Supplements - adding Multivitamin with minerals    PATIENT STATUS: 03/25/24 89/M admitted with AMS. PMH includes HTN.  PT seen for MST.  At time of visit, pt sleeping soundly.  No family at bedside. Pt noted to be AO x1.  No n/v/d reported. Last BM 3/25 (soft).  No wt loss x 5 mo; non-significant wt loss x 11mo (11%) Oral intake has been variable during admission (25-90%) Unable to perform NFPE at time of visit.  RD will attempt as able. Start ONS to maximize intake    ANTHROPOMETRICS:  Ht:  6'0\"  Wt: 68 kg (150 lb).   BMI: Body mass index is 20.34 kg/m².  IBW: 80.9 kg      WEIGHT HISTORY:   Weight loss: Yes, Non-severe Wt loss of 20 lbs, 11%, over 11 months     Wt Readings from Last 10 Encounters:   03/23/24 68 kg (150 lb)   10/14/23 65.8 kg (145 lb)   10/12/23 64.9 kg (143 lb)   05/29/23 77.1 kg (170 lb)        NUTRITION:  Diet:       Procedures    Regular/General diet Texture Consistency: Soft / Easy to Chew ; Is Patient on Accuchecks? No; Misc Restriction: No Straw      Food Allergies: No  Cultural/Ethnic/Spiritism Preferences Addressed: Yes    Percent Meals Eaten (last 3 days)       Date/Time Percent Meals Eaten (%)    03/23/24 1300 25 %    03/23/24 1730 90 %    03/24/24 0945 50 %    03/24/24 1800 50 %            GI system review: WNL Last BM: 3/25  Skin and wounds: PI to back - not staged    NUTRITION RELATED PHYSICAL FINDINGS:     1. Body Fat/Muscle Mass:  EARL     2. Fluid Accumulation: lower extremity edema     NUTRITION PRESCRIPTION: 68 kg  Calories: 9029-4902 calories/day (25-30 kcal/kg)  Protein:  grams protein/day (1.2-1.5 grams protein per kg)  Fluid: ~1 ml/kcal  or per MD discretion    NUTRITION DIAGNOSIS/PROBLEM:  Inadequate oral intake related to physiological causes as evidenced by documented/reported insufficient oral intake      MONITOR AND EVALUATE/NUTRITION GOALS:  PO intake of 75% of meals TID - New  PO intake of 75% of oral nutrition supplement/s - New  Weight stable within 1 to 2 lbs during admission - New  Provide nutrition adequate for wound healing - New      MEDICATIONS:  Lasix, Protonix, Abx, D5 @ 83    LABS:  Na 149    Pt is at Moderate nutrition risk      Divina Kang RD, LDN, [facility]  Clinical Dietitian  Phone [phone]

## 2024-03-25 NOTE — PROCEDURES
[facility]    David Loyola Patient Status:  Inpatient    3/22/1933 MRN [id number]   Location [facility] 7NE-A Attending Anand Villalta MD   Hosp Day # 2 PCP Js Espana, DO     Thoracentesis Procedure Note    Consent: Informed consent was obtained. Risks of the procedure were discussed including: infection, bleeding, pain, pneumothorax.    Under sterile conditions the patient was positioned. chlorhexidine solution and sterile drapes were utilized.  US was used to petros effusion.  1% buffered lidocaine was used to anesthetize the rib space. Fluid was obtained without any difficulties and minimal blood loss.  A dressing was applied to the wound and wound care instructions were provided.     Findings  1500 ml of clear nalini colored pleural fluid was obtained. A sample was sent to Pathology for cytogenetics, and cell counts, as well as for infection analysis.  Drainage was stopped at 1500cc due to coughing.    Complications:  None; patient tolerated the procedure well.            Condition: stable    A follow up chest x-ray was ordered.

## 2024-03-25 NOTE — PLAN OF CARE
Assumed care at 0730  A&Ox1-2, VSS, up with stand/pivot   No complaints of pain   No chance in neuro status   Pat exchanged   Thoracentesis complete at bedside   OT/ST eval complete  Mepilex on sacrum changed- CDI   Mepilex on back- CDI   IVF infusing per order   Patient and family updated on POC   All questions answered   Call light within reach

## 2024-03-26 ENCOUNTER — APPOINTMENT (OUTPATIENT)
Dept: GENERAL RADIOLOGY | Facility: HOSPITAL | Age: 89
End: 2024-03-26
Attending: HOSPITALIST
Payer: MEDICARE

## 2024-03-26 LAB
ANION GAP SERPL CALC-SCNC: 7 MMOL/L (ref 0–18)
BASOPHILS # BLD AUTO: 0.04 X10(3) UL (ref 0–0.2)
BASOPHILS NFR BLD AUTO: 0.4 %
BUN BLD-MCNC: 28 MG/DL (ref 9–23)
CALCIUM BLD-MCNC: 8.1 MG/DL (ref 8.5–10.1)
CHLORIDE SERPL-SCNC: 115 MMOL/L (ref 98–112)
CO2 SERPL-SCNC: 23 MMOL/L (ref 21–32)
CREAT BLD-MCNC: 1.6 MG/DL
EGFRCR SERPLBLD CKD-EPI 2021: 40 ML/MIN/1.73M2 (ref 60–?)
EOSINOPHIL # BLD AUTO: 0.2 X10(3) UL (ref 0–0.7)
EOSINOPHIL NFR BLD AUTO: 1.9 %
ERYTHROCYTE [DISTWIDTH] IN BLOOD BY AUTOMATED COUNT: 21 %
GLUCOSE BLD-MCNC: 101 MG/DL (ref 70–99)
HCT VFR BLD AUTO: 31.5 %
HGB BLD-MCNC: 9.1 G/DL
IMM GRANULOCYTES # BLD AUTO: 0.05 X10(3) UL (ref 0–1)
IMM GRANULOCYTES NFR BLD: 0.5 %
LYMPHOCYTES # BLD AUTO: 2.07 X10(3) UL (ref 1–4)
LYMPHOCYTES NFR BLD AUTO: 19.7 %
MCH RBC QN AUTO: 23.3 PG (ref 26–34)
MCHC RBC AUTO-ENTMCNC: 28.9 G/DL (ref 31–37)
MCV RBC AUTO: 80.8 FL
MONOCYTES # BLD AUTO: 0.84 X10(3) UL (ref 0.1–1)
MONOCYTES NFR BLD AUTO: 8 %
NEUTROPHILS # BLD AUTO: 7.32 X10 (3) UL (ref 1.5–7.7)
NEUTROPHILS # BLD AUTO: 7.32 X10(3) UL (ref 1.5–7.7)
NEUTROPHILS NFR BLD AUTO: 69.5 %
OSMOLALITY SERPL CALC.SUM OF ELEC: 306 MOSM/KG (ref 275–295)
PLATELET # BLD AUTO: 214 10(3)UL (ref 150–450)
POTASSIUM SERPL-SCNC: 3.4 MMOL/L (ref 3.5–5.1)
RBC # BLD AUTO: 3.9 X10(6)UL
SODIUM SERPL-SCNC: 145 MMOL/L (ref 136–145)
WBC # BLD AUTO: 10.5 X10(3) UL (ref 4–11)

## 2024-03-26 PROCEDURE — 74230 X-RAY XM SWLNG FUNCJ C+: CPT | Performed by: HOSPITALIST

## 2024-03-26 PROCEDURE — 99232 SBSQ HOSP IP/OBS MODERATE 35: CPT | Performed by: HOSPITALIST

## 2024-03-26 RX ORDER — HALOPERIDOL 5 MG/ML
2 INJECTION INTRAMUSCULAR ONCE
Status: DISCONTINUED | OUTPATIENT
Start: 2024-03-26 | End: 2024-03-28

## 2024-03-26 RX ORDER — POTASSIUM CHLORIDE 1.5 G/1.58G
20 POWDER, FOR SOLUTION ORAL EVERY OTHER DAY
Status: SHIPPED | COMMUNITY
Start: 2024-03-26

## 2024-03-26 RX ORDER — LEVOFLOXACIN 250 MG/1
250 TABLET, FILM COATED ORAL EVERY 24 HOURS
Qty: 7 TABLET | Refills: 0 | Status: SHIPPED | OUTPATIENT
Start: 2024-03-26 | End: 2024-03-28

## 2024-03-26 RX ORDER — LOSARTAN POTASSIUM 25 MG/1
25 TABLET ORAL DAILY
Qty: 30 TABLET | Refills: 0 | Status: SHIPPED | OUTPATIENT
Start: 2024-03-26

## 2024-03-26 RX ORDER — POTASSIUM CHLORIDE 1.5 G/1.58G
40 POWDER, FOR SOLUTION ORAL ONCE
Status: COMPLETED | OUTPATIENT
Start: 2024-03-26 | End: 2024-03-26

## 2024-03-26 RX ORDER — QUETIAPINE FUMARATE 25 MG/1
25 TABLET, FILM COATED ORAL ONCE
Status: DISCONTINUED | OUTPATIENT
Start: 2024-03-26 | End: 2024-03-26

## 2024-03-26 RX ORDER — LEVOFLOXACIN 250 MG/1
250 TABLET, FILM COATED ORAL DAILY
Status: DISCONTINUED | OUTPATIENT
Start: 2024-03-26 | End: 2024-03-28

## 2024-03-26 NOTE — DISCHARGE SUMMARY
[facility]     David Loyola Patient Status:  Inpatient    3/22/1933 MRN [id number]   Location [facility] 7NE-A Attending Anand Villalta MD   Hosp Day # 5 PCP Js Espana DO     Date of Admission: 3/23/2024  Date of Discharge: 3/28/2024  Discharge Disposition: snf  Chief complaint:   Chief Complaint   Patient presents with    Altered Mental Status    Fall         Discharge Diagnoses and Brief Synopsis:  #Mechanical fall    #Small extra-axial hemorrhage, subdural versus epidural vs calcified meningioma  -no surgical intervention; hold anticoag; repeat CT head 1 month OP per neurosurgery     #CHF EF  35-40% with severe aortic regurg and moderate TR-losartan added by cards, who were on consult.  Doubt would tolerate BB currently     #Large right pleural effusion  -s/p thora 1.5 L off on 3/25.  Studies pending     #UTI-pseudomonas on culture; switched from iv zosyn to oral levaquin     #Hypernatremia; resolved-eating and drinking well on day of discharge     #Diarrhea  -Ongoing for months  -C diff negative  -Stool PCR negative     #RLE edema  -RLE US negative for DVT     #History of urinary retention-luis changed 3/25     #GERD          Lab/Test results pending at Discharge:   Cultures and thoracentesis studies        Admission History of Present Illness (author of HPI not necessarily myself; see H&P author): David Loyola is a 89 year old male with past medical history of hypertension, hyperlipidemia, urinary retention with chronic Luis who presents ED for unwitnessed fall.  Patient's wife found patient on floor.  Time of fall is unknown.  Patient's family reports patient a day ago that showed pleural effusion.  He has been on diuretics which has not seemed to help.  He has also been on had chest x-ray Macrobid for UTI.  Patient's family reports he has had worsening confusion and has not seem like himself.  Family reports patient has chronic swelling in right leg that has not  changed.  Patient used to be able to ambulate with walker but is now requiring maximal assistance.         Lace+ Score: 76  59-90 High Risk  29-58 Medium Risk  0-28   Low Risk       TCM Follow-Up Recommendation:  LACE > 58: High Risk of readmission after discharge from the hospital.      Discharge Medication List:     Discharge Medications        START taking these medications        Instructions Prescription details   levoFLOXacin 250 MG Tabs  Commonly known as: Levaquin      Take 1 tablet (250 mg total) by mouth daily for 5 days.   Stop taking on: April 2, 2024  Quantity: 5 tablet  Refills: 0     losartan 25 MG Tabs  Commonly known as: Cozaar      Take 1 tablet (25 mg total) by mouth daily.   Quantity: 30 tablet  Refills: 0            CHANGE how you take these medications        Instructions Prescription details   potassium chloride 20 MEQ Pack  Commonly known as: Klor-Con  What changed: when to take this      Take 20 mEq by mouth every other day.   Refills: 0            CONTINUE taking these medications        Instructions Prescription details   cyanocobalamin 500 MCG Tabs  Commonly known as: Vitamin B12      Take 1 tablet (500 mcg total) by mouth daily.   Refills: 0     escitalopram 5 MG Tabs  Commonly known as: Lexapro      Take 1 tablet (5 mg total) by mouth daily.   Refills: 0     finasteride 5 MG Tabs  Commonly known as: Proscar      Take 1 tablet (5 mg total) by mouth daily.   Quantity: 30 tablet  Refills: 5     fluticasone propionate 50 MCG/ACT Susp  Commonly known as: Flonase      1 spray by Each Nare route daily.   Refills: 0     furosemide 40 MG Tabs  Commonly known as: Lasix      Take 1 tablet (40 mg total) by mouth 2 (two) times daily.   Refills: 0     Multi-Day Tabs      Take by mouth.   Refills: 0     omeprazole 20 MG Cpdr  Commonly known as: PriLOSEC      Take 1 capsule (20 mg total) by mouth every morning.   Refills: 0     PreserVision AREDS 2 Caps      Take 1 capsule by mouth 2 (two) times daily  with meals.   Refills: 0            STOP taking these medications      docusate sodium 100 MG Caps  Commonly known as: Colace                  Where to Get Your Medications        Please  your prescriptions at the location directed by your doctor or nurse    Bring a paper prescription for each of these medications  levoFLOXacin 250 MG Tabs  losartan 25 MG Tabs         [place]    Follow-up appointment:   SAFIA Henry, DO  [address]  [facility] [id number] [phone]    Follow up in 1 month(s)  repeat head CT needed in 1 month    Js Espana S, DO  [address] [facility] [address]  [facility] [id number] [phone]    Follow up in 1 week(s)  repeat head CT needed in 1 month  to discuss thoracentesis results    Noé Corcoran MD  [address] [id number] [phone]    Follow up in 2 week(s)  office will call to schedule follow up    Appointments for Next 30 Days 3/28/2024 - 4/27/2024      None            Vital signs:  Temp:  [97 °F (36.1 °C)-98.2 °F (36.8 °C)] 97.5 °F (36.4 °C)  Pulse:  [56-78] 70  Resp:  [18-19] 19  BP: ()/(39-57) 92/47  SpO2:  [87 %-97 %] 94 %    Physical Exam:    General: No acute distress.  Respiratory: Clear to auscultation bilaterally. No wheezes. No rhonchi.  Cardiovascular: S1, S2. Regular rate and rhythm. No murmurs.  Abdomen: Soft, nontender, nondistended.    Extremities: No edema.  -----------------------------------------------------------------------------------------------  PATIENT DISCHARGE INSTRUCTIONS: See electronic chart    Anand Uribe MD    Time spent:   33  minutes

## 2024-03-26 NOTE — PLAN OF CARE
Assumed care 1930  No neuro changes  Tolerating room air  Fall precautions in place  Turned and repositioned  Patient did not want to participate in neuro assessment early this AM, moving all extremities

## 2024-03-26 NOTE — PROGRESS NOTES
[facility]   part of [facility]     Hospitalist Progress Note     David Loyola Patient Status:  Inpatient    3/22/1933 MRN [id number]   Formerly [facility] 7NE-A Attending Anand Villalta MD   Hosp Day # 3 PCP Js Espana DO     Chief Complaint:   Chief Complaint   Patient presents with    Altered Mental Status    Fall       Subjective:     Patient denies any complaints     Objective:    Review of Systems:   4 point ROS completed and was negative, except for pertinent positive and negatives stated in subjective.    Vital signs:  Temp:  [97.4 °F (36.3 °C)-98.5 °F (36.9 °C)] 97.7 °F (36.5 °C)  Pulse:  [59-73] 63  Resp:  [15-20] 17  BP: (100-131)/(46-63) 109/46  SpO2:  [95 %-99 %] 95 %    Physical Exam:    General: No acute distress.  Respiratory: Clear to auscultation bilaterally. No wheezes. No rhonchi.  Cardiovascular: S1, S2. Regular rate and rhythm. No murmurs.  Abdomen: Soft, nontender, nondistended.    Extremities: No edema.    Diagnostic Data:    Labs:  Recent Labs   Lab 24  0725 24  0528 24  0632   WBC 11.0 10.1 10.5   HGB 9.2* 8.9* 9.1*   MCV 77.9* 74.2* 80.8   .0 234.0 214.0   INR 1.20  --   --        Recent Labs   Lab 24  0725 24  0528 24  0632   GLU 96 100* 101*   BUN 30* 32* 28*   CREATSERUM 1.44* 1.25 1.60*   CA 8.5 8.0* 8.1*   ALB 2.7* 2.3*  --    * 149* 145   K 3.5 3.5 3.4*   * 120* 115*   CO2 27.0 26.0 23.0   ALKPHO 85 74  --    AST 24 13*  --    ALT 17 13*  --    BILT 0.5 0.4  --    TP 6.8 6.1*  --        Estimated Creatinine Clearance: 28.9 mL/min (A) (based on SCr of 1.6 mg/dL (H)).    Recent Labs   Lab 24  0725   PTP 15.3*   INR 1.20            COVID-19 Lab Results    COVID-19  Lab Results   Component Value Date    COVID19 Not Detected 2024    COVID19 Not Detected 10/14/2023       Pro-Calcitonin  No results for input(s): \"PCT\" in the last 168 hours.    Cardiac  No results for input(s): \"TROP\", \"PBNP\" in the  last 168 hours.    Creatinine Kinase  Recent Labs   Lab 03/23/24  0725   CK 53       Inflammatory Markers  No results for input(s): \"CRP\", \"URIEL\", \"LDH\", \"DDIMER\" in the last 168 hours.    Recent Labs   Lab 03/23/24  0725   TROPHS 14   CK 53       Imaging: Imaging data reviewed in Epic.    Medications:    potassium chloride  40 mEq Oral Once    losartan  25 mg Oral Daily    multivitamin with minerals  1 tablet Oral Daily    piperacillin-tazobactam  3.375 g Intravenous Q8H    escitalopram  5 mg Oral Daily    finasteride  5 mg Oral Daily    furosemide  40 mg Oral BID (Diuretic)    pantoprazole  20 mg Oral QAM AC       Assessment & Plan:      #Mechanical fall  -CT head reviewed  -PT OT appreciated     #Small extra-axial hemorrhage, subdural versus epidural vs calcified meningioma  -CT head reviewed  -no surgical intervention; hold anticoag; repeat CT head 1 month OP per neurosurgery     #CHF EF  35-40% with severe aortic regurg and moderate TR-losartan added by cards.  Doubt would tolerate BB currently    #Large right pleural effusion  -pod1 s/p thora 1.5 L off.  Studies pending     #UTI-pseudomonas prelim on culture; zosyn ongoing; switch to levaquin 3/26    #Hypernatremia-resolved; eating well today; stop fluids    #Diarrhea  -Ongoing for months  -C diff negative  -Stool PCR negative     #RLE edema  -Chronically swollen but appears more swollen per family  -RLE US negative for DVT     #History of urinary retention  -RN reports to me that luis changed 3/25     #GERD  -PPI     Medically cleared from my perspective if OK with all specialists    Plan of care discussed with patient and RN    Anand Uribe MD    Supplementary Documentation:     Quality:  DVT Prophylaxis: scds; avoid chemical prophylaxis, given possible intracranial bleed  CODE status: see chart  Luis: no  Central line: no      Estimated date of discharge: tbd  At this point Mr. Loyola is expected to be discharge to: tbd

## 2024-03-26 NOTE — PROGRESS NOTES
Progress Note  David Loyola Patient Status:  Inpatient    3/22/1933 MRN [id number]   Location [facility] 7NE-A Attending Anand Villalta MD   Hosp Day # 3 PCP Js Espana,      Subjective:  Pt denies chest pain, SOB. Asking for his son and wants to go home.     Objective:  /46 (BP Location: Left arm)   Pulse 63   Temp 97.7 °F (36.5 °C) (Oral)   Resp 17   Wt 150 lb (68 kg)   SpO2 95%   BMI 20.34 kg/m²     Telemetry: SB 40s-50s with PVCs overnight    Intake/Output:    Intake/Output Summary (Last 24 hours) at 3/26/2024 09  Last data filed at 3/26/2024 0644  Gross per 24 hour   Intake 996 ml   Output --   Net 996 ml       Last 3 Weights   24 1156 150 lb (68 kg)   24 0720 150 lb (68 kg)   10/14/23 1430 145 lb (65.8 kg)   10/14/23 0928 145 lb (65.8 kg)   10/12/23 0034 143 lb (64.9 kg)   10/11/23 1713 143 lb (64.9 kg)       Labs:  Recent Labs   Lab 24  0725 24  0528 24  0632   GLU 96 100* 101*   BUN 30* 32* 28*   CREATSERUM 1.44* 1.25 1.60*   EGFRCR 46* 54* 40*   CA 8.5 8.0* 8.1*   * 149* 145   K 3.5 3.5 3.4*   * 120* 115*   CO2 27.0 26.0 23.0     Recent Labs   Lab 24  0725 24  0528 24  0632   RBC 4.07 3.84 3.90   HGB 9.2* 8.9* 9.1*   HCT 31.7* 28.5* 31.5*   MCV 77.9* 74.2* 80.8   MCH 22.6* 23.2* 23.3*   MCHC 29.0* 31.2 28.9*   RDW 20.2 20.4 21.0   NEPRELIM 7.79* 7.56 7.32   WBC 11.0 10.1 10.5   .0 234.0 214.0         Recent Labs   Lab 24  0725   TROPHS 14   CK 53       Diagnostics:  XR CHEST AP PORTABLE  (CPT=71045)    Result Date: 3/25/2024  CONCLUSION:  The patient is status post right thoracentesis.  There is decrease in the large right pleural effusion.  There is some consolidation in the right upper lobe which most likely represents post re-expansion edema or pneumonia.  No pneumothorax. Normal heart size.  The vascularity in the left lung is unremarkable.  There is slight left-sided peribronchial thickening.    LOCATION:  [id number]      Dictated by (CST): Carlos A Esposito MD on 3/25/2024 at 1:14 PM     Finalized by (CST): Carlos A Esposito MD on 3/25/2024 at 1:16 PM       US THORACENTESIS GUIDED RIGHT (CPT=32555)    Result Date: 3/25/2024  CONCLUSION:  Ultrasound guidance was provided Dr. Melgoza for a right-sided thoracentesis.  Please see his report for further evaluation.   LOCATION:  [place] [address]    Dictated by (CST): Carlos A Esposito MD on 3/25/2024 at 10:28 AM     Finalized by (CST): Carlos A Esposito MD on 3/25/2024 at 10:29 AM       Review of Systems   Cardiovascular:  Negative for chest pain, dyspnea on exertion and leg swelling.   Respiratory:  Negative for cough and shortness of breath.        Physical Exam:    Gen: alert, NAD  Heent: pupils equal, reactive. Mucous membranes moist.   Neck: no jvd  Cardiac: regular rate and rhythm, normal S1,S2  Lungs: Diminished, coarse bilaterally  Abd: soft, NT/ND +bs  Ext: no edema  Skin: Warm, dry  Neuro: No focal deficits      Medications:     potassium chloride  40 mEq Oral Once    levoFLOXacin  250 mg Oral Q24H    losartan  25 mg Oral Daily    multivitamin with minerals  1 tablet Oral Daily    escitalopram  5 mg Oral Daily    finasteride  5 mg Oral Daily    furosemide  40 mg Oral BID (Diuretic)    pantoprazole  20 mg Oral QAM AC       Assessment:  Subdural Hematoma, s/p Unwitnessed Fall  CT Brain w R frontal SDH, no mass effect  Plans for repeat CT Brain in 1 month per Neurosurgery team  Pleural Effusion  S/P Thoracentesis 1500ml 3/25  CXR w/decreased effusion, no plans for further procedures  Pulm following  Acute on Chronic HFrEF   CXR w/R pleural effusion  LVEF 35-40%, mod-sev AR, mild MR, mild-mod TR, PASP 40-45mmHg  Not on BB d/t bradycardia; started losartan  Diuresing with po Lasix - incomplete I&O, wts not done  Chronic Pat, UTI  On levaquin per primary  Hypertension   BP well controlled controlled  Losartan  Dysphagia  ST following  Swallow study pending  today    Plan:  S/P thoracentesis yesterday - on room air, no complaints of dyspnea.   Continue losartan, oral furosemide. No plans to escalate GDMT in setting of advanced age, frailty. Will favor conservative management.   Antibiotics for UTI per primary    Plan of care discussed with patient, RN.    Bertha Doll, APRN  3/26/2024  9:24 AM  [phone] [facility]  [phone] [facility]      Seen and examined. Unwitnessed fall with subdural being watched. Post thoracentesis 1500cc presumably CHF in patient with EF 35% and moderate sever AI. Treat with diuretics and ARB given age and frailty. On ABX for UT.

## 2024-03-26 NOTE — PLAN OF CARE
Patient oriented to self. Intermittently agitated/restless. Wanting to go home. Neuros q4hr. Unremarkable exam. RA. BP stable. Chronic luis in place. Up to chair.     1900- Patient experiencing severe agitation. Screaming that he wants to go home repeatedly. Pulling at lines/monitors. Will try one time dose of seroquel 25mg per on call hospitalist.

## 2024-03-26 NOTE — PROGRESS NOTES
[facility] PULMONARY/CRITICAL CARE    S: No events overnight. Patient agitated and verbally combative.    Meds:   potassium chloride  40 mEq Oral Once    levoFLOXacin  250 mg Oral Q24H    losartan  25 mg Oral Daily    multivitamin with minerals  1 tablet Oral Daily    escitalopram  5 mg Oral Daily    finasteride  5 mg Oral Daily    furosemide  40 mg Oral BID (Diuretic)    pantoprazole  20 mg Oral QAM AC       Prn Meds:  ipratropium-albuterol, acetaminophen, melatonin, guaiFENesin ER, benzonatate, glycerin-hypromellose-, sodium chloride, ondansetron, metoclopramide, polyethylene glycol (PEG 3350), sennosides, bisacodyl, fleet enema    Infusions:      OBJECTIVE:  Vitals:    03/25/24 1630 03/25/24 2000 03/26/24 0000 03/26/24 0540   BP: 131/56 100/50 120/48 109/46   Pulse: 67 70 59 63   Resp: 20 15 18 17   Temp: 98.5 °F (36.9 °C) 97.7 °F (36.5 °C) 97.4 °F (36.3 °C) 97.7 °F (36.5 °C)   TempSrc: Oral Temporal Temporal Oral   SpO2: 95% 99% 99% 95%   Weight:           Oxygen Therapy  SpO2: 95 %  O2 Device: None (Room air)  O2 Flow Rate (L/min): 0 L/min  Pulse Oximetry Type: Intermittent  Oximetry Probe Site Changed: No  Pulse Ox Probe Location: Right hand           I/O last 3 completed shifts:  In: 1096 [I.V.:996; IV PIGGYBACK:100]  Out: 600 [Urine:600]  No intake/output data recorded.    Lungs: diminished breath sounds base - right  Heart: regular rate and rhythm  Abdomen: soft, non-tender; bowel sounds normal; no masses,  no organomegaly  Extremities: extremities normal, atraumatic, no cyanosis or edema    Labs:  Recent Labs   Lab 03/23/24  0725 03/24/24  0528 03/26/24  0632   RBC 4.07 3.84 3.90   HGB 9.2* 8.9* 9.1*   HCT 31.7* 28.5* 31.5*   MCV 77.9* 74.2* 80.8   MCH 22.6* 23.2* 23.3*   MCHC 29.0* 31.2 28.9*   RDW 20.2 20.4 21.0   NEPRELIM 7.79* 7.56 7.32   WBC 11.0 10.1 10.5   .0 234.0 214.0     Recent Labs   Lab 03/23/24  0725 03/24/24  0528 03/26/24  0632   GLU 96 100* 101*   BUN 30* 32* 28*   CREATSERUM 1.44*  1.25 1.60*   CA 8.5 8.0* 8.1*   ALB 2.7* 2.3*  --    * 149* 145   K 3.5 3.5 3.4*   * 120* 115*   CO2 27.0 26.0 23.0   ALKPHO 85 74  --    AST 24 13*  --    ALT 17 13*  --    BILT 0.5 0.4  --    TP 6.8 6.1*  --      No results for input(s): \"PCT\" in the last 168 hours.  No results for input(s): \"CRP\", \"DDIMER\", \"LDH\", \"URIEL\", \"CK\" in the last 72 hours.  Lab Results   Component Value Date    COVID19 Not Detected 03/23/2024     No results for input(s): \"ABGPHT\", \"CQGWBQ7U\", \"MVGPB0X\", \"ABGHCO3\", \"LM\", \"FIO2\" in the last 72 hours.  Recent Labs     03/23/24  1022   LACTI 1.2       Lab Results   Component Value Date    INR 1.20 03/23/2024    INR 1.30 (H) 05/29/2023    INR 1.23 (H) 05/28/2023        Recent Labs   Lab 03/23/24  0725   CK 53       Imaging -- reviewed and visualized  CXR -- large R pleural effusion, improved from previous CXR.  No PTX  Assessment and Plan    R sided pleural effusion: large, presumed to be cardiac in origin   - completed thoracentesis yesterday with 1500cc of dark nalini fluid removed.  There was likely a significant amount of fluid still present based on CXR, but procedure stopped due to excessive coughing.    - based on fluid analysis appears to be consistent with old transudative fluid.  - cytology pending  - effusion present since at least 10/2023, which fits with the analysis above.  - echo with EF 35-40%, pulm HTN and moderate to severe aortic stenosis.  - given patient's lack of sig symptoms and baseline neuro status, would not rec repeat thora at this time.  Dispo -- OK for dc from a pulm standpoint.  -no need for out patient pulm follow up.  -will follow PRN, call with questions.      Veto Melgoza M.D.  Pulmonary/Critical Care and Sleep Medicine

## 2024-03-26 NOTE — CM/SW NOTE
[facility] referrals checked-- currently no accepting at this time. Updated progress notes sent, extended referrals/deadline. Discussed with RN, POA prefers DC to [facility] (formerly [facility]).     Will provide update when referral window closes.    Addendum: Spoke with pt POA, Dr. Loyola, would prefer [facility]. SW called [facility] liaison, will review referral. Will also call [facility] if able to accept pt back.     11:30- pt approved for [facility], reserved and starting insurance authorization. Met with pt/son at bedside to provide update.     KIMBERLY Luan

## 2024-03-26 NOTE — SLP NOTE
ADULT VIDEOFLUOROSCOPIC SWALLOWING STUDY    Admission Date: 3/23/2024  Evaluation Date: 03/26/24  Radiologist: Dr. Swann    RECOMMENDATIONS   Diet Recommendations - Solids: Soft/ Easy to chew  Diet Recommendations - Liquids: Nectar thick liquids/ Mildly thick    Further Follow-up:  Follow Up Needed (Documentation Required): Yes  SLP Follow-up Date: 03/27/24  Compensatory Strategies Recommended: No straws;Slow rate;Alternate consistencies;Small bites and sips;Extra sauce/gravy  Aspiration Precautions: Upright position;Slow rate;Small bites and sips  Medication Administration Recommendations: Present with thickened liquid (take whole or crushed in pureed if any difficulty)  Treatment Plan/Recommendations: Dysphagia therapy;Aspiration precautions    HISTORY   Background/Objective Information:    Problem List  Principal Problem:    Altered mental status, unspecified altered mental status type  Active Problems:    GINETTE (acute kidney injury) (HCC)    Acute subdural hematoma (HCC)    Urinary tract infection without hematuria, site unspecified    Pleural effusion    Diarrhea    Acute cystitis without hematuria      Past Medical History  Past Medical History:   Diagnosis Date    Essential hypertension     Pat catheter in place     H/O transurethral resection of prostate     Hypercholesterolemia     Neuropathy     Urinary retention        Current Diet Consistency: Regular;Thin liquids  Prior Level of Function: Assistance/Support for ADL's  Prior Living Situation: Assisted living  History of Recent: No recent respiratory difficulty  Precautions: Hard of hearing (Right ear worse; talk on Left)  Imaging results: CXR 3/25/24  Impression   CONCLUSION:  The patient is status post right thoracentesis.  There is decrease in the large right pleural effusion.  There is some consolidation in the right upper lobe which most likely represents post re-expansion edema or pneumonia.  No pneumothorax.  Normal heart size.  The vascularity in  the left lung is unremarkable.  There is slight left-sided peribronchial thickening.       Reason for Referral: R/O aspiration      Family/Patient Goals:  Safest/least restrictive diet     ASSESSMENT   DYSPHAGIA ASSESSMENT  Test completed in conjunction with Radiologist.  Patient Positioned: Upright;Midline;IRMA chair.  Patient Viewed: Lateral.  Patient Alertness: Fully alert.  Consistencies Presented: Thin liquids;Nectar thick liquids/ Mildly thick;Puree;Soft solid to assess oropharyngeal swallow function and assess for compensatory strategies to improve safe swallow function.    THIN LIQUIDS  Method of Presentation: Teaspoon;Cup  Oral Phase of Swallow (VFSS - Thin Liquids): Impaired  Bolus Retrieval (VFSS - Thin Liquids): Intact  Bilabial Seal (VFSS - Thin Liquids): Intact  Bolus Formation (VFSS - Thin Liquids): Impaired  Bolus Propulsion (VFSS - Thin Liquids): Impaired  Triggered at: Pyriform sinuses  Premature Spillage to: Pyriform sinuses  Delay (seconds): 1-2 seconds  Residue Severity, Location: Mild/Mod;Valleculae;Pyriform sinuses  Cleared/Reduced with: Secondary swallow (mostly cleared from pyriform sinus; majority of vallecular residue remained)  Laryngeal Penetration: After the swallow  Tracheal Aspiration: After the swallow (spilling over from pyriform sinus prior to any clearing)  Cough/Throat Clear Response: No  Cough/Throat Clear Effective: No  Strategy(ies) Implemented (Thin Liquids):  (pt unable to follow positioning strategies in attempts to prevent aspiration due cognitive deficits)  Effectiveness: No  NECTAR THICK LIQUIDS/ MILDLY THICK  Method of Presentation: Cup  Oral Phase of Swallow (VFSS - Nectar Thick Liquids): Impaired  Bolus Retrieval (VFSS - Nectar Thick Liquids): Intact  Bilabial Seal (VFSS - Nectar Thick Liquids): Intact  Bolus Formation (VFSS - Nectar Thick Liquids): Impaired  Bolus Propulsion (VFSS - Nectar Thick Liquids): Impaired  Triggered at: Valleculae;Aryepiglottic  folds  Premature Spillage to: Valleculae  Delay (seconds): 1 second  Residue Severity, Location: Mild/Mod;Valleculae  Cleared/Reduced with: Secondary swallow (partially cleared)  Laryngeal Penetration: After the swallow (due to spillover of residue)  Tracheal Aspiration: None  Cough/Throat Clear Response: No     PUREE  Oral Phase of Swallow (VFSS - Puree): Within Functional Limits  Triggered at: Valleculae  Delay (seconds): minimal  Residue Severity, Location: Mild;Valleculae  Cleared/Reduced with: Secondary swallow;Liquid assist  Laryngeal Penetration: None  Tracheal Aspiration: None  SOFT SOLID  Oral Phase of Swallow (VFSS - Soft Solid): Impaired  Bolus Retrieval (VFSS - Soft Solid): Intact  Bilabial Seal (VFSS - Soft Solid): Intact  Bolus Formation (VFSS - Soft Solid): Intact  Bolus Propulsion (VFSS - Soft Solid): Intact  Mastication (VFSS - Soft Solid):  (mildly prolonged but complete)  Triggered at: Valleculae  Delay (seconds): none  Residue Severity, Location: Mild/Mod;Valleculae  Cleared/Reduced with: Secondary swallow;Liquid assist  Laryngeal Penetration: None  Tracheal Aspiration: None     Penetration Aspiration Scale Score: Score 8: Material enters the airway, passes below the vocal folds, and no effort is made to eject       Overall Impression:   Pt transported via mammography chair; alert but agitated and repeatedly requesting to go home; agreeable to po trials with coaxing.  Radiologist present in room, head of chair positioned upright to 90 degrees and patient assisted with and observed feeding himself po trials of thin via spoon & cup, nectar via cup, pureed and soft solid consistencies. Results of exam revealed mild oral/moderate pharyngeal dysphagia characterized by mildly prolonged but complete mastication of solids; decreased bolus formation and propulsion with mild delay in oral transit; no significant oral residue post swallow. Premature spillage of thin and nectar thick liquids to valleculae  and spilling into pyriform sinus noted. Reduced base of tongue retraction/hyolaryngeal excursion resulted in mild-moderate vallecular and pyriform sinus with both consistencies. Silent, spillover aspiration of thin liquids from pyriform sinus residue observed. Pt was unable to utilize positional strategies in attempts to prevent aspiration due to cognitive deficits. More timely pharyngeal response noted with nectar thick liquids, pureed and soft solid consistencies. Intermittent laryngeal penetration noted with nectar thick consistency and no laryngeal penetration or aspiration occurred with pureed or soft solid. Cued dry swallows mostly cleared residue from pyriform sinus but only partially cleared valleculae. Reduced epiglottic inversion with incomplete laryngeal closure noted at height of swallow.     Results of exam, aspiration risks, diet recommendations, aspiration precautions and plan discussed with patient and pt's son in room. Utilized video to describe results and answer questions to son's apparent satisfaction. Informed RN of results and recommendations. Will continue to follow as per plan.               GOALS  Goal #1 The patient will tolerate soft and easy  consistency and nectar thick liquids without overt signs or symptoms of aspiration with 95 % accuracy over 2 session(s).  In Progress   Goal #2 The patient/family/caregiver will demonstrate understanding and implementation of aspiration precautions and swallow strategies independently over 2 session(s).    In Progress   Goal #3 The patient will utilize compensatory strategies as outlined by  BSSE (clinical evaluation) including Slow rate, Small bites, Small sips, Alternate liquids/solids, Upright 90 degrees, Supervision with meals with as needed assistance 100 % of the time across 2 sessions.  In Progress                                  EDUCATION/INSTRUCTION  Reviewed results and recommendations with patient/family/caregiver.   Agreement/Understanding verbalized and all questions answered to their apparent satisfaction.    INTERDISCIPLINARY COMMUNICATION  Reviewed results with Radiologist; agreement verbalized.    Patient Experiencing Pain: No                FOLLOW UP  Treatment Plan/Recommendations: Dysphagia therapy;Aspiration precautions  Number of Visits to Meet Established Goals: 2        Thank you for your referral.   If you have any questions, please contact Serene GUILLORY, SLP    Serene Rivera MA, CCC-SLP  Pager [phone]

## 2024-03-27 ENCOUNTER — APPOINTMENT (OUTPATIENT)
Dept: GENERAL RADIOLOGY | Facility: HOSPITAL | Age: 89
End: 2024-03-27
Attending: NURSE PRACTITIONER
Payer: MEDICARE

## 2024-03-27 LAB
NON GYNE INTERPRETATION: NEGATIVE
POTASSIUM SERPL-SCNC: 4.2 MMOL/L (ref 3.5–5.1)

## 2024-03-27 PROCEDURE — 71046 X-RAY EXAM CHEST 2 VIEWS: CPT | Performed by: NURSE PRACTITIONER

## 2024-03-27 PROCEDURE — 99232 SBSQ HOSP IP/OBS MODERATE 35: CPT | Performed by: HOSPITALIST

## 2024-03-27 RX ORDER — FUROSEMIDE 40 MG/1
40 TABLET ORAL DAILY
Status: DISCONTINUED | OUTPATIENT
Start: 2024-03-28 | End: 2024-03-28

## 2024-03-27 NOTE — PROGRESS NOTES
[facility]   part of [facility]     Hospitalist Progress Note     David Loyola Patient Status:  Inpatient    3/22/1933 MRN [id number]   [facility] 7NE-A Attending Anand Villalta MD   Hosp Day # 4 PCP Js Espana DO     Chief Complaint:   Chief Complaint   Patient presents with    Altered Mental Status    Fall       Subjective:     No issues overnight    Objective:        Vital signs:  Temp:  [97.5 °F (36.4 °C)-98.7 °F (37.1 °C)] 97.7 °F (36.5 °C)  Pulse:  [61-79] 61  Resp:  [15-20] 18  BP: (108-120)/(44-53) 117/49  SpO2:  [91 %-96 %] 92 %    Physical Exam:    General: No acute distress. Somnolent but rousable  Respiratory: Clear to auscultation bilaterally. No wheezes. No rhonchi.  Cardiovascular: S1, S2. Regular rate and rhythm. No murmurs.  Abdomen: Soft, nontender, nondistended.    Extremities: No edema.    Diagnostic Data:    Labs:  Recent Labs   Lab 24  0725 24  0528 24  0632   WBC 11.0 10.1 10.5   HGB 9.2* 8.9* 9.1*   MCV 77.9* 74.2* 80.8   .0 234.0 214.0   INR 1.20  --   --        Recent Labs   Lab 24  0725 24  0528 24  0632 24  0629   GLU 96 100* 101*  --    BUN 30* 32* 28*  --    CREATSERUM 1.44* 1.25 1.60*  --    CA 8.5 8.0* 8.1*  --    ALB 2.7* 2.3*  --   --    * 149* 145  --    K 3.5 3.5 3.4* 4.2   * 120* 115*  --    CO2 27.0 26.0 23.0  --    ALKPHO 85 74  --   --    AST 24 13*  --   --    ALT 17 13*  --   --    BILT 0.5 0.4  --   --    TP 6.8 6.1*  --   --        Estimated Creatinine Clearance: 28.9 mL/min (A) (based on SCr of 1.6 mg/dL (H)).    Recent Labs   Lab 24  0725   PTP 15.3*   INR 1.20            COVID-19 Lab Results    COVID-19  Lab Results   Component Value Date    COVID19 Not Detected 2024    COVID19 Not Detected 10/14/2023       Pro-Calcitonin  No results for input(s): \"PCT\" in the last 168 hours.    Cardiac  No results for input(s): \"TROP\", \"PBNP\" in the last 168 hours.    Creatinine  Kinase  Recent Labs   Lab 03/23/24  0725   CK 53       Inflammatory Markers  No results for input(s): \"CRP\", \"URIEL\", \"LDH\", \"DDIMER\" in the last 168 hours.    Recent Labs   Lab 03/23/24  0725   TROPHS 14   CK 53       Imaging: Imaging data reviewed in Epic.    Medications:    levoFLOXacin  250 mg Oral Daily    haloperidol lactate  2 mg Intravenous Once    losartan  25 mg Oral Daily    multivitamin with minerals  1 tablet Oral Daily    escitalopram  5 mg Oral Daily    finasteride  5 mg Oral Daily    furosemide  40 mg Oral BID (Diuretic)    pantoprazole  20 mg Oral QAM AC       Assessment & Plan:      #Mechanical fall  -CT head reviewed  -PT OT appreciated     #Small extra-axial hemorrhage, subdural versus epidural vs calcified meningioma  -CT head reviewed  -no surgical intervention; hold anticoag; repeat CT head 1 month OP per neurosurgery     #CHF EF  35-40% with severe aortic regurg and moderate TR-losartan added by cards.  Doubt would tolerate BB currently    #Large right pleural effusion  -pod1 s/p thora 1.5 L off.  Studies pending     #UTI-pseudomonas prelim on culture; zosyn ongoing; switched to levaquin 3/26    #Hypernatremia-resolved; eating well as of 3/26    #Diarrhea  -Ongoing for months  -C diff negative  -Stool PCR negative     #RLE edema  -Chronically swollen but appears more swollen per family  -RLE US negative for DVT     #History of urinary retention  -RN reports to me that luis changed 3/25     #GERD  -PPI     Medically cleared from my perspective if OK with all specialists    Plan of care discussed with patient and RN    Anand Uribe MD    Supplementary Documentation:     Quality:  DVT Prophylaxis: scds; avoid chemical prophylaxis, given possible intracranial bleed  CODE status: see chart  Luis: no  Central line: no      Estimated date of discharge: today  At this point Mr. Loyola is expected to be discharge to: d

## 2024-03-27 NOTE — PLAN OF CARE
Assumed care 1930  Patient alert, forgetful at times  Reoriented   Family updated  Frequently rounded on  No neuro changes

## 2024-03-27 NOTE — PROGRESS NOTES
[facility]  Cardiology Progress Note    David Loyola Patient Status:  Inpatient    3/22/1933 MRN [id number]   Location [facility] 7NE-A Attending Anand Villalta MD   Hosp Day # 4 PCP Js Espana,        Subjective:  No acute events overnight   No new complaints today    --------------------------------------------------------------------------------------------------------------------------------  ROS 12 systems reviewed and at baseline, pertinent findings above.      History:  Past Medical History:   Diagnosis Date    Essential hypertension     Pat catheter in place     H/O transurethral resection of prostate     Hypercholesterolemia     Neuropathy     Urinary retention      History reviewed. No pertinent surgical history.  No family history on file.   reports that he has never smoked. He has never used smokeless tobacco. He reports that he does not currently use alcohol. He reports that he does not use drugs.    Objective:   Temp: 97.8 °F (36.6 °C)  Pulse: 75  Resp: 18  BP: 121/70    Intake/Output:     Intake/Output Summary (Last 24 hours) at 3/27/2024 1207  Last data filed at 3/27/2024 1000  Gross per 24 hour   Intake 2675 ml   Output 1175 ml   Net 1500 ml       Physical Exam:  General: NAD.  HEENT: Normocephalic.  Neck: Supple.  Cardiac: RRR. S1, S2 normal. No murmur.  Lungs: Diminished effort.  Extremities: RLE edema.    Tele NSR    Assessment:    HFrEF, LVEF 35-40%, mod-sev AI  HTN      Plan:  Presented following fall. Had SDH, which is managed conservatively. Incidentally noted HFrEF. No plans for LHC. Will pursue medical management given advanced age and frailty.  Diuresed well on lasix. Also underwent 1500cc thoracentesis. Today without SOB, orthopnea. On room oxygen. Cr uptrend, likely associated with overdiuresis  RLE edema. DVT negative on US. Likely secondary to venous insufficiency, and immobility not CHF.  DC IV lasix. Switch to PO lasix 40mg once daily tomorrow  Limited GDMT.  Unable to start BB due to bradycardia. Continue ARB. Would reassess Cr again in a few days    Discussed with patient. Questions answered.    Please call with questions.     Level of care: L3    Kumar Fernandez MD  Interventional Cardiology  [facility]    3/27/2024  12:07 PM

## 2024-03-27 NOTE — DISCHARGE INSTRUCTIONS
Diet Recommendations - Solids: Soft/ Easy to chew  Diet Recommendations - Liquids: Nectar thick liquids/ Mildly thick  Compensatory Strategies Recommended: No straws;Slow rate;Alternate consistencies;Small bites and sips;Extra sauce/gravy  Aspiration Precautions: Upright position;Slow rate;Small bites and sips  Medication Administration Recommendations: Present with thickened liquid (take whole or crushed in pureed if any difficulty)    Physical Therapy Evaluation & Treatment  Occupational Therapy Evaluation & Treatment  Speech Therapy Evaluation & Treatment    Chronic Pat - Exchanged on 3/23/24    Activity - Up X2 with Walker to Chair & BR.

## 2024-03-27 NOTE — PAYOR COMM NOTE
--------------  CONTINUED STAY REVIEW  3/26-3/27  Payor: [employer] MEDICARE ADV PPO  Subscriber #:  [id number]  Authorization Number: [id number]    Admit date: 3/23/24  Admit time: 11:43 AM    Admitting Physician: Devendra Low DO  Attending Physician:  Anand Villalta MD  Primary Care Physician: Js Espana DO    REVIEW DOCUMENTATION:  3/26  Vital signs:  Temp:  [97.4 °F (36.3 °C)-98.5 °F (36.9 °C)] 97.7 °F (36.5 °C)  Pulse:  [59-73] 63  Resp:  [15-20] 17  BP: (100-131)/(46-63) 109/46  SpO2:  [95 %-99 %] 95 %     Physical Exam:    General: No acute distress.  Respiratory: Clear to auscultation bilaterally. No wheezes. No rhonchi.  Cardiovascular: S1, S2. Regular rate and rhythm. No murmurs.  Abdomen: Soft, nontender, nondistended.    Extremities: No edema.     Diagnostic Data:    Labs:        Recent Labs   Lab 03/23/24  0725 03/24/24  0528 03/26/24  0632   WBC 11.0 10.1 10.5   HGB 9.2* 8.9* 9.1*   MCV 77.9* 74.2* 80.8   .0 234.0 214.0   INR 1.20  --   --                Recent Labs   Lab 03/23/24  0725 03/24/24  0528 03/26/24  0632   GLU 96 100* 101*   BUN 30* 32* 28*   CREATSERUM 1.44* 1.25 1.60*   CA 8.5 8.0* 8.1*   ALB 2.7* 2.3*  --    * 149* 145   K 3.5 3.5 3.4*   * 120* 115*   CO2 27.0 26.0 23.0   ALKPHO 85 74  --    AST 24 13*  --    ALT 17 13*  --    BILT 0.5 0.4  --    TP 6.8 6.1*  --       piperacillin-tazobactam  3.375 g Intravenous Q8H   Assessment & Plan:   #Mechanical fall  -CT head reviewed  -PT OT appreciated     #Small extra-axial hemorrhage, subdural versus epidural vs calcified meningioma  -CT head reviewed  -no surgical intervention; hold anticoag; repeat CT head 1 month OP per neurosurgery     #CHF EF  35-40% with severe aortic regurg and moderate TR-losartan added by cards.  Doubt would tolerate BB currently     #Large right pleural effusion  -pod1 s/p thora 1.5 L off.  Studies pending     #UTI-pseudomonas prelim on culture; zosyn ongoing; switch to levaquin 3/26      #Hypernatremia-resolved; eating well today; stop fluids     #Diarrhea  -Ongoing for months  -C diff negative  -Stool PCR negative     #RLE edema  -Chronically swollen but appears more swollen per family  -RLE US negative for DVT     #History of urinary retention  -RN reports to me that luis changed 3/25     #GERD  -PPI         3/26 Pulmonary  Patient agitated and verbally combative.     R sided pleural effusion: large, presumed to be cardiac in origin   - completed thoracentesis yesterday with 1500cc of dark nalini fluid removed.  There was likely a significant amount of fluid still present based on CXR, but procedure stopped due to excessive coughing.    - based on fluid analysis appears to be consistent with old transudative fluid.  - cytology pending  - effusion present since at least 10/2023, which fits with the analysis above.  - echo with EF 35-40%, pulm HTN and moderate to severe aortic stenosis.  - given patient's lack of sig symptoms and baseline neuro status, would not rec repeat thora at this time.      3/26 Cardiology  Assessment:  Subdural Hematoma, s/p Unwitnessed Fall  CT Brain w R frontal SDH, no mass effect  Plans for repeat CT Brain in 1 month per Neurosurgery team  Pleural Effusion  S/P Thoracentesis 1500ml 3/25  CXR w/decreased effusion, no plans for further procedures  Pulm following  Acute on Chronic HFrEF   CXR w/R pleural effusion  LVEF 35-40%, mod-sev AR, mild MR, mild-mod TR, PASP 40-45mmHg  Not on BB d/t bradycardia; started losartan  Diuresing with po Lasix - incomplete I&O, wts not done  Chronic Luis, UTI  On levaquin per primary  Hypertension   BP well controlled controlled  Losartan  Dysphagia  ST following  Swallow study pending today     Plan:  S/P thoracentesis yesterday - on room air, no complaints of dyspnea.   Continue losartan, oral furosemide. No plans to escalate GDMT in setting of advanced age, frailty. Will favor conservative management.   Antibiotics for UTI per  primary        3/26 Social Work  Insurance auth pending to [facility]      3/26 RN  Patient experiencing severe agitation. Screaming that he wants to go home repeatedly. Pulling at lines/monitors. Will try one time dose of seroquel 25mg per on call hospitalist.       3/27  Vital signs:  Temp:  [97.5 °F (36.4 °C)-98.7 °F (37.1 °C)] 97.7 °F (36.5 °C)  Pulse:  [61-79] 61  Resp:  [15-20] 18  BP: (108-120)/(44-53) 117/49  SpO2:  [91 %-96 %] 92 %     Physical Exam:    General: No acute distress. Somnolent but rousable  Respiratory: Clear to auscultation bilaterally. No wheezes. No rhonchi.  Cardiovascular: S1, S2. Regular rate and rhythm. No murmurs.  Abdomen: Soft, nontender, nondistended.    Extremities: No edema.            Recent Labs   Lab 03/23/24  0725 03/24/24  0528 03/26/24  0632 03/27/24  0629   GLU 96 100* 101*  --    BUN 30* 32* 28*  --    CREATSERUM 1.44* 1.25 1.60*  --    CA 8.5 8.0* 8.1*  --    ALB 2.7* 2.3*  --   --    * 149* 145  --    K 3.5 3.5 3.4* 4.2   * 120* 115*  --    CO2 27.0 26.0 23.0  --    ALKPHO 85 74  --   --    AST 24 13*  --   --    ALT 17 13*  --   --    BILT 0.5 0.4  --   --    TP 6.8 6.1*  --   --       haloperidol lactate  2 mg Intravenous Once     Assessment & Plan:   #Mechanical fall  -CT head reviewed  -PT OT appreciated     #Small extra-axial hemorrhage, subdural versus epidural vs calcified meningioma  -CT head reviewed  -no surgical intervention; hold anticoag; repeat CT head 1 month OP per neurosurgery     #CHF EF  35-40% with severe aortic regurg and moderate TR-losartan added by cards.  Doubt would tolerate BB currently     #Large right pleural effusion  -pod1 s/p thora 1.5 L off.  Studies pending     #UTI-pseudomonas prelim on culture; zosyn ongoing; switched to levaquin 3/26     #Hypernatremia-resolved; eating well as of 3/26     #Diarrhea  -Ongoing for months  -C diff negative  -Stool PCR negative     #RLE edema  -Chronically swollen but appears more swollen  per family  -RLE US negative for DVT     #History of urinary retention  -RN reports to me that luis changed 3/25     #GERD  -PPI            MEDICATIONS ADMINISTERED IN LAST 1 DAY:  escitalopram (Lexapro) tab 5 mg       Date Action Dose Route User    3/26/2024 1040 Given 5 mg Oral Shannan Chino RN          finasteride (Proscar) tab 5 mg       Date Action Dose Route User    3/26/2024 1041 Given 5 mg Oral Shannan Chino RN          furosemide (Lasix) tab 40 mg       Date Action Dose Route User    3/26/2024 1800 Given 40 mg Oral Shannan Chino RN    3/26/2024 1041 Given 40 mg Oral Shannan Chino RN          levoFLOXacin (Levaquin) tab 250 mg       Date Action Dose Route User    3/26/2024 1131 Given 250 mg Oral Shannan Chino RN          losartan (Cozaar) tab 25 mg       Date Action Dose Route User    3/26/2024 1040 Given 25 mg Oral Shannan Chino RN          potassium chloride (Klor-Con) 20 MEQ oral powder 40 mEq       Date Action Dose Route User    3/26/2024 1040 Given 40 mEq Oral Shannan Chino RN          multivitamin with minerals (Thera M Plus) tab 1 tablet       Date Action Dose Route User    3/26/2024 1524 Given 1 tablet Oral Shanann Chino RN            Vitals (last day)       Date/Time Temp Pulse Resp BP SpO2 Weight O2 Device O2 Flow Rate (L/min) Who    03/27/24 0800 97.8 °F (36.6 °C) 65 18 107/47 88 % -- None (Room air) -- JUAN ANTONIO    03/27/24 0600 97.7 °F (36.5 °C) 61 18 117/49 92 % -- None (Room air) -- VO    03/27/24 0000 97.8 °F (36.6 °C) 65 17 113/53 96 % -- None (Room air) -- VO    03/26/24 1930 98.7 °F (37.1 °C) 73 15 112/48 94 % -- None (Room air) -- SB    03/26/24 1600 97.6 °F (36.4 °C) 76 18 111/47 94 % -- None (Room air) -- SS    03/26/24 1200 97.5 °F (36.4 °C) 74 20 108/44 95 % -- None (Room air) --     03/26/24 0800 97.5 °F (36.4 °C) 79 18 120/50 91 % -- None (Room air) --     03/26/24 0750 -- 79 -- -- 96 % -- -- --     03/26/24 0540 97.7 °F (36.5 °C) 63 17 109/46 95 % -- None (Room air)  -- SB    03/26/24 0000 97.4 °F (36.3 °C) 59 18 120/48 99 % -- None (Room air) -- RP

## 2024-03-27 NOTE — CM/SW NOTE
Awaiting insurance auth for [facility]  [place], submitted on their end on 3/26. DC order placed, avoidable days entered due to payer barriers.     SW following.    ADDENDUM: insurance auth approved for [facility]  [place]. Transport scheduled for 4:30 pm, PCS completed.     RN to call [facility] at [phone] for report.    ADDENDUM: pt not medically cleared by all consults, cardiology to reassess tomorrow. Cancelled [facility], and notified [facility].     KIMBERLY Luna

## 2024-03-27 NOTE — PHYSICAL THERAPY NOTE
PHYSICAL THERAPY TREATMENT NOTE - INPATIENT    Room Number: 7611/7611-A     Session: 1    Number of Visits to Meet Established Goals: 5    Presenting Problem: AMS-s/p fall, UTI and pleural effusion; acute SDH  Co-Morbidities : HTN, BPH, chronic luis d/t urinary retention, R chronic torn glut med      CT of brain 3/24/24-Impression  CONCLUSION:  Stable noncontrast head CT with stable extra-axial hyperdensity measuring 16 x 19 x 5 mm overlying the right frontal lobe.  This may represent a partially calcified meningioma.  The subdural hemorrhage is a differential consideration.  This   appears stable on follow-up scan.  No interval acute hemorrhage.    CT of cx spine 3/23/24-CONCLUSION:  Degenerative changes involving the cervical spine, but no fracture or subluxation seen.  Partially seen subtotal opacification of the right chest, with only minimal aeration.  Consistent with large right pleural effusion and lung   atelectasis, potential malignant effusion with underlying lung malignancy on the right not excluded.    CT of brain 3/23/24-CONCLUSION:  Focal extra-axial hyperdensity right frontal lobe 16 x 19 x 5 mm.  Differential includes a small extra-axial hemorrhage, such as subdural or epidural hemorrhage, versus a plaque-like discoid meningioma.  No midline shift, mass effect, herniation hydrocephalus.  Suggest CT follow-up.      ASSESSMENT   Patient demonstrates limited progress this session, goals  remain in progress.    Patient continues to function below baseline with bed mobility, transfers, and gait.  Contributing factors to remaining limitations include decreased functional strength, decreased endurance/aerobic capacity, pain, and impaired   balance.  Next session anticipate patient to progress transfers and gait.  Physical Therapy will continue to follow patient for duration of hospitalization.    Patient continues to benefit from continued skilled PT services: to promote return to prior level of  function and safety with continuous assistance and gradual rehabilitative therapy .    PLAN  PT Treatment Plan: Bed mobility;Body mechanics;Endurance;Energy conservation;Patient education;Family education;Gait training;Strengthening;Transfer training;Balance training  Rehab Potential : Good  Frequency (Obs): 3-5x/week    CURRENT GOALS     Goal #1 Patient is able to demonstrate supine - sit EOB @ level: supervision     Goal #2 Patient is able to demonstrate transfers EOB to/from Chair/Wheelchair at assistance level: minimum assistance     Goal #3 Patient is able to ambulate 5 feet with assist device: walker - rolling at assistance level: moderate assistance     Goal #4    Goal #5    Goal #6    Goal Comments: Goals established on 3/24/2024  3/27/2024 all goals ongoing.    SUBJECTIVE  \" I am just laying here.\"    OBJECTIVE  Precautions: Bed/chair alarm;Restraints;Hard of hearing;Other (Comment) (chair alarm and seatbelt; hears from L side better)    WEIGHT BEARING RESTRICTION  Weight Bearing Restriction: None                PAIN ASSESSMENT   Ratin  Location: denies pn       BALANCE                                                                                                                       Static Sitting: Fair  Dynamic Sitting: Fair -           Static Standing: Poor +  Dynamic Standing: Poor    ACTIVITY TOLERANCE                         O2 WALK         AM-PAC '6-Clicks' INPATIENT SHORT FORM - BASIC MOBILITY  How much difficulty does the patient currently have...  Patient Difficulty: Turning over in bed (including adjusting bedclothes, sheets and blankets)?: A Little   Patient Difficulty: Sitting down on and standing up from a chair with arms (e.g., wheelchair, bedside commode, etc.): A Little   Patient Difficulty: Moving from lying on back to sitting on the side of the bed?: A Little   How much help from another person does the patient currently need...   Help from Another: Moving to and from a bed to a  chair (including a wheelchair)?: A Lot   Help from Another: Need to walk in hospital room?: A Lot   Help from Another: Climbing 3-5 steps with a railing?: Total       AM-PAC Score:  Raw Score: 14   Approx Degree of Impairment: 61.29%   Standardized Score (AM-PAC Scale): 38.1   CMS Modifier (G-Code): CL    FUNCTIONAL ABILITY STATUS  Gait Assessment   Functional Mobility/Gait Assessment  Gait Assistance: Moderate assistance  Distance (ft): 4 feet  Assistive Device: Rolling walker  Pattern:  (weakness of radha le's, radha dec quad control, shuffle and R dec stance time, radha knee flex)    Skilled Therapy Provided    Bed Mobility:  Rolling: sup   Supine<>Sit: min assist and cues for sequence of movement. Cues for le movement and trunk elevation.    Sit<>Supine: NT     Transfer Mobility:  Sit<>Stand: cues to scoot to edge, min assist and cues for proper hand placement.    Stand<>Sit: min assist   Gait: RW and mod assist. Patient shows radha knee slightly flexed, dec quad control and dec balance. Cues for safety.     Therapist's Comments:   Education provided on  Benefits of upright position  Exercises   Body mechanics         THERAPEUTIC EXERCISES  Lower Extremity Ankle pumps  Heel raises  Heel slides  LAQ  Quad sets  SAQ  SLR     Upper Extremity      Position Sitting and Supine     Repetitions   12   Sets   1       Patient End of Session: Up in chair;Needs met;Call light within reach;RN aware of session/findings;All patient questions and concerns addressed;Alarm set    PT Session Time: 30 minutes  Gait Trainin minutes  Therapeutic Activity: 20 minutes  Therapeutic Exercise: 8 minutes   Neuromuscular Re-education: 0 minutes

## 2024-03-28 VITALS
SYSTOLIC BLOOD PRESSURE: 92 MMHG | OXYGEN SATURATION: 94 % | WEIGHT: 150 LBS | HEART RATE: 70 BPM | DIASTOLIC BLOOD PRESSURE: 47 MMHG | BODY MASS INDEX: 20 KG/M2 | TEMPERATURE: 98 F | RESPIRATION RATE: 19 BRPM

## 2024-03-28 LAB
ANION GAP SERPL CALC-SCNC: 5 MMOL/L (ref 0–18)
BUN BLD-MCNC: 29 MG/DL (ref 9–23)
CALCIUM BLD-MCNC: 8.3 MG/DL (ref 8.5–10.1)
CHLORIDE SERPL-SCNC: 114 MMOL/L (ref 98–112)
CO2 SERPL-SCNC: 25 MMOL/L (ref 21–32)
CREAT BLD-MCNC: 1.4 MG/DL
EGFRCR SERPLBLD CKD-EPI 2021: 47 ML/MIN/1.73M2 (ref 60–?)
GLUCOSE BLD-MCNC: 77 MG/DL (ref 70–99)
OSMOLALITY SERPL CALC.SUM OF ELEC: 303 MOSM/KG (ref 275–295)
POTASSIUM SERPL-SCNC: 3.5 MMOL/L (ref 3.5–5.1)
SODIUM SERPL-SCNC: 144 MMOL/L (ref 136–145)

## 2024-03-28 PROCEDURE — 99239 HOSP IP/OBS DSCHRG MGMT >30: CPT | Performed by: HOSPITALIST

## 2024-03-28 RX ORDER — FUROSEMIDE 40 MG/1
40 TABLET ORAL DAILY
Qty: 30 TABLET | Refills: 0 | Status: SHIPPED | OUTPATIENT
Start: 2024-03-28 | End: 2024-03-28

## 2024-03-28 RX ORDER — LEVOFLOXACIN 250 MG/1
250 TABLET, FILM COATED ORAL EVERY 24 HOURS
Qty: 5 TABLET | Refills: 0 | Status: SHIPPED | OUTPATIENT
Start: 2024-03-28 | End: 2024-04-02

## 2024-03-28 NOTE — PROGRESS NOTES
[facility] Cardiology Progress Note    David Loyola Patient Status:  Inpatient    3/22/1933 MRN [id number]   Location [facility] 7NE-A Attending Anand Villalta MD   Hosp Day # 5 PCP Js Espana,      Subjective:  No acute events overnight  Wants to go home  Denies sob    Objective:  /56 (BP Location: Right arm)   Pulse 69   Temp 97.4 °F (36.3 °C) (Oral)   Resp 19   Wt 150 lb (68 kg)   SpO2 91%   BMI 20.34 kg/m²     Telemetry: nsr/sb      Intake/Output:    Intake/Output Summary (Last 24 hours) at 3/28/2024 1013  Last data filed at 3/28/2024 0800  Gross per 24 hour   Intake 360 ml   Output 1100 ml   Net -740 ml       Last 3 Weights   24 1156 150 lb (68 kg)   24 0720 150 lb (68 kg)   10/14/23 1430 145 lb (65.8 kg)   10/14/23 0928 145 lb (65.8 kg)   10/12/23 0034 143 lb (64.9 kg)   10/11/23 1713 143 lb (64.9 kg)       Labs:  Recent Labs   Lab 24  0528 24  0632 24  0629 24  0607   * 101*  --  77   BUN 32* 28*  --  29*   CREATSERUM 1.25 1.60*  --  1.40*   EGFRCR 54* 40*  --  47*   CA 8.0* 8.1*  --  8.3*   * 145  --  144   K 3.5 3.4* 4.2 3.5   * 115*  --  114*   CO2 26.0 23.0  --  25.0     Recent Labs   Lab 24  0725 24  0528 24  0632   RBC 4.07 3.84 3.90   HGB 9.2* 8.9* 9.1*   HCT 31.7* 28.5* 31.5*   MCV 77.9* 74.2* 80.8   MCH 22.6* 23.2* 23.3*   MCHC 29.0* 31.2 28.9*   RDW 20.2 20.4 21.0   NEPRELIM 7.79* 7.56 7.32   WBC 11.0 10.1 10.5   .0 234.0 214.0         Recent Labs   Lab 24  0725   TROPHS 14   CK 53       Diagnostics:             Physical Exam:    Physical Exam  Cardiovascular:      Rate and Rhythm: Normal rate and regular rhythm.   Pulmonary:      Effort: Pulmonary effort is normal.      Breath sounds: No rales.      Comments: Absent lung sounds RML, RLL  Musculoskeletal:      Right lower leg: No edema.      Left lower leg: No edema.   Skin:     General: Skin is warm and dry.   Neurological:       Mental Status: He is alert.         Medications:   furosemide  40 mg Oral Daily    levoFLOXacin  250 mg Oral Daily    haloperidol lactate  2 mg Intravenous Once    losartan  25 mg Oral Daily    multivitamin with minerals  1 tablet Oral Daily    escitalopram  5 mg Oral Daily    finasteride  5 mg Oral Daily    pantoprazole  20 mg Oral QAM AC     LVEF:35-40  BB:no r/t bradycardia  ACE/ARB/ARNI: x  MRNA: no r/t ckd and age  SGLT2-inhibitor:no /rt ckd and age  ICD/Device: DNR  Discharge/dry weight:  Cardiomems candidate: no  Heart Failure Clinic Referral Placed: no  Inpatient HF orderset: x, no cardiac rehab or nutrition consult plz, conservative mgmt with diet as desired.         Assessment:    Acute  HFrEF with Mod-sev AI: new EF 35-40%, conservative mgmt only   R Plueral effusion: s/p 1500ml thora with minimal improvement on xr.   HTN: controlled  Unwitnessed Fall with traumatic SDH  Chronic Pat    Plan:    Ok to discharge from cardiology perspective    I'd put him back on his home Lasix 40mg BID, rather than 40mgdaily, he came in wet and has R pleureal effusion which seems in part chronic as noted on cxr 2023    Lorelei Austin, APRN  3/28/2024  10:13 AM  Ph [phone] ([place])  Ph [phone] ([place])

## 2024-03-28 NOTE — PLAN OF CARE
Assumed pt care at 0730  No new neurological changes  SBP within targeted parameters  RA  Tele NSR with BBB  No c/o pain  Ate >50% of meals  Up to chair  Discharge on hold; repeat CXR completed.  Plan to discharge to [facility] tomorrow pending Cr

## 2024-03-28 NOTE — PLAN OF CARE
Assumed pt care at 1930  A&O to self  Neuro checks Q4, no new changes  Waiting ins auth  Denies pain or discomfort  Call light in reach  Bed in low position

## 2024-03-28 NOTE — SLP NOTE
SPEECH DAILY NOTE - INPATIENT    ASSESSMENT & PLAN   ASSESSMENT  Pt seen for dysphagia tx to assess tolerance with recommended diet, ensure proper utilization of aspiration precautions and provide pt/family education. Patient received awake, alert, and pleasant. No visitors present. Patient on room air. He is asking when is is going to be discharged. He is aware that he is going to a rehab facility but does not know which one. Patient recalled VFSS however not the results or recommendations. VFSS education completed as well as initiated training in recommended compensatory strategies and aspiration precautions.  Noted straw cup with thin liquids at bedside. Patient educated on pillars of pneumonia as well however given underlying cognitive/mental status, recommend ongoing reinforcement of dysphagia compensatory strategies and aspiration precautions.  Patient expressed understanding.        Diet Recommendations - Solids: Soft/ Easy to chew  Diet Recommendations - Liquids: Nectar thick liquids/ Mildly thick  Compensatory Strategies Recommended: No straws;Slow rate;Alternate consistencies;Small bites and sips;Extra sauce/gravy  Aspiration Precautions: Upright position;Slow rate;Small bites and sips  Medication Administration Recommendations: Present with thickened liquid (take whole or crushed in pureed if any difficulty)    Patient Experiencing Pain: No     Treatment Plan  Treatment Plan/Recommendations: Dysphagia therapy;Aspiration precautions    Interdisciplinary Communication: Discussed with RN            GOALS  Goal #1 The patient will tolerate soft and easy  consistency and nectar thick liquids without overt signs or symptoms of aspiration with 95 % accuracy over 2 session(s).  In Progress   Goal #2 The patient/family/caregiver will demonstrate understanding and implementation of aspiration precautions and swallow strategies independently over 2 session(s).     In Progress   Goal #3 The patient will utilize  compensatory strategies as outlined by  BSSE (clinical evaluation) including Slow rate, Small bites, Small sips, Alternate liquids/solids, Upright 90 degrees, Supervision with meals with as needed assistance 100 % of the time across 2 sessions.  In Progress     FOLLOW UP  Follow Up Needed (Documentation Required): Yes  SLP Follow-up Date: 03/29/24  Number of Visits to Meet Established Goals: 2    Session: 1    If you have any questions, please contact Freya Deluna, SLP

## 2024-03-28 NOTE — PLAN OF CARE
Discharge instructions, prescriptions, medications & follow-up appointments in discharge paperwork sent to [facility] [place]. Son & wife at bedside & aware of POC. Will meet him at [place].  Verbal Report called to RN.  To [facility] via ambulance.

## 2024-03-28 NOTE — DIETARY NOTE
[facility]   part of [facility]    NUTRITION ASSESSMENT    Pt does not meet malnutrition criteria at this time.    NUTRITION INTERVENTION:    Meal and Snacks - Monitor and encourage adequate PO intake.   Medical Food Supplements - Magic Cup BID. Rationale/use for oral supplements discussed.  Vitamin and Mineral Supplements - adding Multivitamin with minerals    PATIENT STATUS:   3/28 - Pt sitting up in chair, no family at bedside. AO x 1. RN reports pt ate well for breakfast. PO per EMR %. No n/v/d reported. Last BM 3/28 (soft).  Had Ensure ordered, but now on thickened liquids - add Magic Cup BID.  Possible discharge this date per notes    03/25/24 89/M admitted with AMS. PMH includes HTN.  PT seen for MST.  At time of visit, pt sleeping soundly.  No family at bedside. Pt noted to be AO x1.  No n/v/d reported. Last BM 3/25 (soft).  No wt loss x 5 mo; non-significant wt loss x 11mo (11%) Oral intake has been variable during admission (25-90%) Unable to perform NFPE at time of visit.  RD will attempt as able. Start ONS to maximize intake    ANTHROPOMETRICS:  Ht:  6'0\"  Wt: 68 kg (150 lb).   BMI: Body mass index is 20.34 kg/m².  IBW: 80.9 kg      WEIGHT HISTORY:   Weight loss: Yes, Non-severe Wt loss of 20 lbs, 11%, over 11 months     Wt Readings from Last 10 Encounters:   03/23/24 68 kg (150 lb)   10/14/23 65.8 kg (145 lb)   10/12/23 64.9 kg (143 lb)   05/29/23 77.1 kg (170 lb)        NUTRITION:  Diet:       Procedures    Regular/General diet Fluid Consistency: Nectar Thick / Mildly Thick Liquids; Texture Consistency: Soft / Easy to Chew ; Is Patient on Accuchecks? No; Misc Restriction: No Straw      Food Allergies: No  Cultural/Ethnic/[place]ze Preferences Addressed: Yes    Percent Meals Eaten (last 3 days)       Date/Time Percent Meals Eaten (%)    03/27/24 1000 75 %     Percent Meals Eaten (%): ate cinnamon roll from [employer] at 03/27/24 1000    03/27/24 1350 50 %    03/27/24 1800 100 %    03/28/24  0800 100 %            GI system review: WNL Last BM: 3/28  Skin and wounds: PI to back - not staged    NUTRITION RELATED PHYSICAL FINDINGS:     1. Body Fat/Muscle Mass:  EARL     2. Fluid Accumulation: lower extremity edema     NUTRITION PRESCRIPTION: 68 kg  Calories: 3669-7780 calories/day (25-30 kcal/kg)  Protein:  grams protein/day (1.2-1.5 grams protein per kg)  Fluid: ~1 ml/kcal or per MD discretion    NUTRITION DIAGNOSIS/PROBLEM:  Inadequate oral intake related to physiological causes as evidenced by documented/reported insufficient oral intake      MONITOR AND EVALUATE/NUTRITION GOALS:  PO intake of 75% of meals TID - New  PO intake of 75% of oral nutrition supplement/s - New  Weight stable within 1 to 2 lbs during admission - New  Provide nutrition adequate for wound healing - New      MEDICATIONS:  Lasix, Protonix, MVI,    LABS:  BUN 29; Cr 1.4    Pt is at Low nutrition risk    Divina Kang RD, LDN, CNSC  Clinical Dietitian  Phone [phone]

## 2024-03-28 NOTE — CM/SW NOTE
Notified pt is cleared for DC by all consults. Scheduled ambulance transport for 4:30 PM, PCS completed.     RN to call [facility] [place] at [phone] for report.     KIMBERLY Luna

## 2024-04-01 NOTE — PAYOR COMM NOTE
--------------  DISCHARGE REVIEW    Payor: [employer] MEDICARE ADV PPO  Subscriber #:  [id number]  Authorization Number: [id number]    Admit date: 3/23/24  Admit time:  11:43 AM  Discharge Date: 3/28/2024  5:57 PM     Admitting Physician: Devendra Low DO  Attending Physician:  No att. providers found  Primary Care Physician: Js Espana DO          Discharge Summary Notes        Discharge Summary signed by Anand Villalta MD at 3/28/2024  6:00 PM       Author: Anand Villalta MD Specialty: HOSPITALIST, Internal Medicine Author Type: Physician    Filed: 3/28/2024  6:00 PM Date of Service: 3/26/2024  8:14 AM Status: Signed    : Anand Villalta MD (Physician)           [facility]     David Loyola Patient Status:  Inpatient    3/22/1933 MRN [id number]   Location [facility] 7NE-A Attending Anand Villalta MD   Hosp Day # 5 PCP Js Espana DO     Date of Admission: 3/23/2024  Date of Discharge: 3/28/2024  Discharge Disposition: snf  Chief complaint:   Chief Complaint   Patient presents with    Altered Mental Status    Fall         Discharge Diagnoses and Brief Synopsis:  #Mechanical fall    #Small extra-axial hemorrhage, subdural versus epidural vs calcified meningioma  -no surgical intervention; hold anticoag; repeat CT head 1 month OP per neurosurgery     #CHF EF  35-40% with severe aortic regurg and moderate TR-losartan added by cards, who were on consult.  Doubt would tolerate BB currently     #Large right pleural effusion  -s/p thora 1.5 L off on 3/25.  Studies pending     #UTI-pseudomonas on culture; switched from iv zosyn to oral levaquin     #Hypernatremia; resolved-eating and drinking well on day of discharge     #Diarrhea  -Ongoing for months  -C diff negative  -Stool PCR negative     #RLE edema  -RLE US negative for DVT     #History of urinary retention-luis changed 3/25     #GERD          Lab/Test results pending at Discharge:   Cultures and thoracentesis  studies        Admission History of Present Illness (author of HPI not necessarily myself; see H&P author): David Loyola is a 89 year old male with past medical history of hypertension, hyperlipidemia, urinary retention with chronic Pat who presents ED for unwitnessed fall.  Patient's wife found patient on floor.  Time of fall is unknown.  Patient's family reports patient a day ago that showed pleural effusion.  He has been on diuretics which has not seemed to help.  He has also been on had chest x-ray Macrobid for UTI.  Patient's family reports he has had worsening confusion and has not seem like himself.  Family reports patient has chronic swelling in right leg that has not changed.  Patient used to be able to ambulate with walker but is now requiring maximal assistance.         Lace+ Score: 76  59-90 High Risk  29-58 Medium Risk  0-28   Low Risk       TCM Follow-Up Recommendation:  LACE > 58: High Risk of readmission after discharge from the hospital.      Discharge Medication List:     Discharge Medications        START taking these medications        Instructions Prescription details   levoFLOXacin 250 MG Tabs  Commonly known as: Levaquin      Take 1 tablet (250 mg total) by mouth daily for 5 days.   Stop taking on: April 2, 2024  Quantity: 5 tablet  Refills: 0     losartan 25 MG Tabs  Commonly known as: Cozaar      Take 1 tablet (25 mg total) by mouth daily.   Quantity: 30 tablet  Refills: 0            CHANGE how you take these medications        Instructions Prescription details   potassium chloride 20 MEQ Pack  Commonly known as: Klor-Con  What changed: when to take this      Take 20 mEq by mouth every other day.   Refills: 0            CONTINUE taking these medications        Instructions Prescription details   cyanocobalamin 500 MCG Tabs  Commonly known as: Vitamin B12      Take 1 tablet (500 mcg total) by mouth daily.   Refills: 0     escitalopram 5 MG Tabs  Commonly known as: Lexapro      Take 1  tablet (5 mg total) by mouth daily.   Refills: 0     finasteride 5 MG Tabs  Commonly known as: Proscar      Take 1 tablet (5 mg total) by mouth daily.   Quantity: 30 tablet  Refills: 5     fluticasone propionate 50 MCG/ACT Susp  Commonly known as: Flonase      1 spray by Each Nare route daily.   Refills: 0     furosemide 40 MG Tabs  Commonly known as: Lasix      Take 1 tablet (40 mg total) by mouth 2 (two) times daily.   Refills: 0     Multi-Day Tabs      Take by mouth.   Refills: 0     omeprazole 20 MG Cpdr  Commonly known as: PriLOSEC      Take 1 capsule (20 mg total) by mouth every morning.   Refills: 0     PreserVision AREDS 2 Caps      Take 1 capsule by mouth 2 (two) times daily with meals.   Refills: 0            STOP taking these medications      docusate sodium 100 MG Caps  Commonly known as: Colace                  Where to Get Your Medications        Please  your prescriptions at the location directed by your doctor or nurse    Bring a paper prescription for each of these medications  levoFLOXacin 250 MG Tabs  losartan 25 MG Tabs         [place]    Follow-up appointment:   SAFIA Henry DO  [address]  [facility] [address]  [place] [address]  [phone]    Follow up in 1 month(s)  repeat head CT needed in 1 month    Js Espana DO  [address] [facility]  [address] [place] [address] [id number] [phone]    Follow up in 1 week(s)  repeat head CT needed in 1 month  to discuss thoracentesis results    Noé Corcoran MD  [address] [place] [address]  [phone]    Follow up in 2 week(s)  office will call to schedule follow up    Appointments for Next 30 Days 3/28/2024 - 4/27/2024      None            Vital signs:  Temp:  [97 °F (36.1 °C)-98.2 °F (36.8 °C)] 97.5 °F (36.4 °C)  Pulse:  [56-78] 70  Resp:  [18-19] 19  BP: ()/(39-57) 92/47  SpO2:  [87 %-97 %] 94 %    Physical Exam:    General: No acute distress.  Respiratory: Clear to auscultation bilaterally. No  wheezes. No rhonchi.  Cardiovascular: S1, S2. Regular rate and rhythm. No murmurs.  Abdomen: Soft, nontender, nondistended.    Extremities: No edema.  -----------------------------------------------------------------------------------------------  PATIENT DISCHARGE INSTRUCTIONS: See electronic chart    Anand Uribe MD    Time spent:   33  minutes      Electronically signed by Anand Uribe MD on 3/28/2024  6:00 PM         REVIEWER COMMENTS      PLEASE FAX DETERMINATION OF DAYS [phone]  3/23/24-3/28/24

## (undated) DEVICE — TUR/ENDOSCOPIC CABLE, 10' (3.05 M): Brand: CONMED

## (undated) DEVICE — SYRINGE MED 30ML STD CLR PLAS LL TIP N CTRL

## (undated) DEVICE — BAG,DRAINAGE,4L,A/R TOWER,METAL CLAMP: Brand: MEDLINE

## (undated) DEVICE — CYSTO CDS-LF: Brand: MEDLINE INDUSTRIES, INC.

## (undated) DEVICE — SLEEVE COMPR M KNEE LEN SGL USE KENDALL SCD

## (undated) DEVICE — SKN PREP SPNG STKS PVP PNT STR: Brand: MEDLINE INDUSTRIES, INC.

## (undated) DEVICE — CATHETER URETH 20FR 30CC BLLN LTX AMB WVN

## (undated) DEVICE — SOLUTION IRRIG 3000ML 0.9% NACL FLX CONT

## (undated) DEVICE — STERILE POLYISOPRENE POWDER-FREE SURGICAL GLOVES: Brand: PROTEXIS

## (undated) NOTE — IP AVS SNAPSHOT
Patient Demographics     Address  [address]  [facility] [address] Phone  [phone] (Home)  [phone] (Mobile) *Preferred* E-mail Address  [email or web address]      Patient Contacts     Name Relation Home Work Mobile    JAVIER CASAS Power of    [phone]    RAYMON CASAS Son   [phone]    DAMON FENG Daughter   [phone]      Allergies as of 3/28/2024  Review status set to Review Complete on 3/23/2024   No Known Allergies     Code Status Information     Code Status    DNAR/Full Treatment        Patient Instructions       Diet Recommendations - Solids: Soft/ Easy to chew  Diet Recommendations - Liquids: Nectar thick liquids/ Mildly thick  Compensatory Strategies Recommended: No straws;Slow rate;Alternate consistencies;Small bites and sips;Extra sauce/gravy  Aspiration Precautions: Upright position;Slow rate;Small bites and sips  Medication Administration Recommendations: Present with thickened liquid (take whole or crushed in pureed if any difficulty)    Physical Therapy Evaluation & Treatment  Occupational Therapy Evaluation & Treatment  Speech Therapy Evaluation & Treatment    Chronic Pat - Exchanged on 3/23/24    Activity - Up X2 with Walker to Chair & BR.      Follow-up Information     SAFIA Henry, DO Follow up in 1 month(s).    Specialty: NEUROSURGERY  Why: repeat head CT needed in 1 month  Contact information:  [address]  [facility] [address]  [facility] [address] [phone]             Js Espana, DO Follow up in 1 week(s).    Specialty: Internal Medicine  Why: repeat head CT needed in 1 month  to discuss thoracentesis results  Contact information:  [address] [facility] [address]  [facility] [address] [phone]             Noé Corcoran MD Follow up in 2 week(s).    Specialties: CARDIOLOGY, Cardiac Electrophysiology  Why: office will call to schedule follow up  Contact information:  [address] [address] [phone]                         Your Home Meds List      TAKE these medications       Instructions Authorizing Provider Morning Afternoon Evening As Needed   cyanocobalamin 500 MCG Tabs  Commonly known as: Vitamin B12  Next dose due: Tomorrow in AM      Take 1 tablet (500 mcg total) by mouth daily.          escitalopram 5 MG Tabs  Commonly known as: Lexapro  Next dose due: Tomorrow in AM      Take 1 tablet (5 mg total) by mouth daily.          finasteride 5 MG Tabs  Commonly known as: Proscar  Next dose due: Tomorrow in AM      Take 1 tablet (5 mg total) by mouth daily.   Elke Wessing         fluticasone propionate 50 MCG/ACT Susp  Commonly known as: Flonase  Next dose due: Tomorrow in AM      1 spray by Each Nare route daily.          furosemide 40 MG Tabs  Commonly known as: Lasix  Next dose due: Tonight       Take 1 tablet (40 mg total) by mouth 2 (two) times daily.          furosemide 40 MG Tabs  Commonly known as: Lasix  Next dose due: 3/29/24 - 9 am      Take 1 tablet (40 mg total) by mouth daily.   Anand Uribe         levoFLOXacin 250 MG Tabs  Commonly known as: Levaquin  Next dose due: Tomorrow in AM      Take 1 tablet (250 mg total) by mouth daily for 5 days.  Stop taking on: April 2, 2024   Anand rUibe         losartan 25 MG Tabs  Commonly known as: Cozaar  Next dose due: Tomorrow in AM      Take 1 tablet (25 mg total) by mouth daily.   Anand Uribe         Multi-Day Tabs  Next dose due: Tomorrow in AM      Take by mouth.          omeprazole 20 MG Cpdr  Commonly known as: PriLOSEC  Next dose due: Tomorrow in AM      Take 1 capsule (20 mg total) by mouth every morning.          potassium chloride 20 MEQ Pack  Commonly known as: Klor-Con  Next dose due: 3/28/24 - 9 am      Take 20 mEq by mouth every other day.   Anand Uribe         PreserVision AREDS 2 Caps  Next dose due: This evening with meal      Take 1 capsule by mouth 2 (two) times daily with meals.                Where to Get Your Medications      Please  your  prescriptions at the location directed by your doctor or nurse    Bring a paper prescription for each of these medications  furosemide 40 MG Tabs  levoFLOXacin 250 MG Tabs  losartan 25 MG Tabs           9020-1222-A - MAR ACTION REPORT  (last 48 hrs)    ** SITE UNKNOWN **     Order ID Medication Name Action Time Action Reason Comments    [id number] escitalopram (Lexapro) tab 5 mg 03/27/24 0959 Given      [id number] escitalopram (Lexapro) tab 5 mg 03/28/24 0921 Given      [id number] finasteride (Proscar) tab 5 mg 03/27/24 0959 Given      [id number] finasteride (Proscar) tab 5 mg 03/28/24 0921 Given      [id number] furosemide (Lasix) tab 40 mg 03/28/24 0921 Given      [id number] levoFLOXacin (Levaquin) tab 250 mg 03/27/24 1001 Given      [id number] levoFLOXacin (Levaquin) tab 250 mg 03/28/24 0922 Given      [id number] losartan (Cozaar) tab 25 mg 03/27/24 0959 Given      [id number] losartan (Cozaar) tab 25 mg 03/28/24 0921 Given      [id number] multivitamin with minerals (Thera M Plus) tab 1 tablet 03/27/24 1257 Given      [id number] multivitamin with minerals (Thera M Plus) tab 1 tablet 03/28/24 1137 Given      [id number] pantoprazole (Protonix) DR tab 20 mg 03/28/24 0549 Given              Recent Vital Signs    Flowsheet Row Most Recent Value   BP 92/47 Filed at 03/28/2024 1515   Pulse 70 Filed at 03/28/2024 1515   Resp 19 Filed at 03/28/2024 1515   Temp 97.5 °F (36.4 °C) Filed at 03/28/2024 1515   SpO2 94 % Filed at 03/28/2024 1515      Patient's Most Recent Weight    Flowsheet Row Most Recent Value   Patient Weight 68 kg (150 lb)         Lab Results Last 24 Hours      Basic Metabolic Panel (8) [[id number]] (Abnormal)  Resulted: 03/28/24 0709, Result status: Final result   Ordering provider: María Elena Allison APRN  03/27/24 2300 Resulting lab: [facility] ([facility])    Specimen Information    Type Source Collected On   Blood — 03/28/24 0607          Components    Component Value Reference Range Flag Lab    Glucose 77 70 - 99 mg/dL — [facility] ([facility])   Sodium 144 136 - 145 mmol/L — [facility] ([facility])   Potassium 3.5 3.5 - 5.1 mmol/L — [facility])   Chloride 114 98 - 112 mmol/L [facility] ([facility])   CO2 25.0 21.0 - 32.0 mmol/L — [facility])   Anion Gap 5 0 - 18 mmol/L — [facility])   BUN 29 9 - 23 mg/dL H [facility])   Creatinine 1.40 0.70 - 1.30 mg/dL H [facility] ([facility])   Calcium, Total 8.3 8.5 - 10.1 mg/dL L [facility] ([facility])   Calculated Osmolality 303 275 - 295 mOsm/kg H [facility])   eGFR-Cr 47 >=60 mL/min/1.73m2 L [facility])            Testing Performed By     Lab - Abbreviation Name Director Address Valid Date Range    139 - [facility] ([facility]) [facility] ([facility]) Goldberg, Cathryn A. MD [address] [place] [id number] 03/19/20 1441 - Present            Microbiology Results (All)     Procedure Component Value Units Date/Time    Blood Culture [[id number]] Collected: 03/23/24 1022    Order Status: Completed Lab Status: Final result Updated: 03/28/24 1401    Specimen: Blood,peripheral      Blood Culture Result No Growth 5 Days    Blood Culture [[id number]] Collected: 03/23/24 1022    Order Status: Completed Lab Status: Final result Updated: 03/28/24 1401    Specimen: Blood,peripheral      Blood Culture Result No Growth 5 Days    Narrative:      Aerobic Bottle Volume - 11 mL    Body Fluid Aerobic, Anaerobic and Gram Stain [[id number]]     Order Status: Sent Lab Status: No result     Specimen: Body fluid, unspecified from Pleural Fluid, Right     Urine Culture, Routine [[id number]]  (Abnormal)  (Susceptibility) Collected: 03/23/24 0724    Order Status: Completed Lab Status: Final result Updated: 03/25/24 0849    Specimen: Urine, luis catheter      Urine Culture >100,000 CFU/ML Pseudomonas aeruginosa    Susceptibility      Pseudomonas aeruginosa      Not Specified     Amikacin >32  Resistant     Cefepime 8  Sensitive     Ceftazidime 8  Sensitive      Ciprofloxacin <=1  Sensitive     Levofloxacin 0.5  Sensitive     Meropenem >8  Resistant     Piperacillin + Tazobactam 8  Sensitive     Tobramycin >8  Resistant                          GI Stool panel by PCR [[id number]] Collected: 03/24/24 1459    Order Status: Completed Lab Status: Final result Updated: 03/24/24 2131    Specimen: Stool      GI Panel Comment: Please Note: This test no longer includes C.difficile toxin. If clinically indicated order separate test for C.difficile toxin.     Campylobacter Pcr Negative     Plesiomonas Shigelloides Pcr Negative     Salmonella Pcr Negative     Vibrio Pcr Negative     Vibrio Cholera Pcr Negative     Yersinia Entercolitica Pcr Negative     Enteroaggregative E. Coli Pcr Negative     Enteropathogenic E. Coli Pcr Negative     Enterotoxigenic E. Coli Pcr Negative     Shig Txn 1/2 Prod E. Coli Pcr Negative     Shig/Enteroinvasive E. Coli Pcr Negative     Cryptosporidium Pcr Negative     Cyclospora Cayetanensis Pcr Negative     Entamoeba Histolytica Pcr Negative     Giardia Lamblia Pcr Negative     Adenovirus F 40/41 Pcr Negative     Astrovirus Pcr Negative     Norovirus Gi/Gii Pcr Negative     Rotavirus A Pcr Negative     Sapovirus Pcr Negative    Narrative:      The GI Panel tests for Campylobacter species jejuni, coli, and   upsaliensis; all species, subspecies, and serovars of Salmonella;   and Vibrio species parahaemolyticus, vulnificus, and cholera.   It does NOT test for Aeromonas or Edwardsiella species.      Clostridium difficile(toxigenic)PCR [[id number]]  (Normal) Collected: 03/24/24 1459    Order Status: Completed Lab Status: Final result Updated: 03/24/24 1603    Specimen: Stool      C. Difficile Toxin B Gene Negative    SARS-CoV-2/Flu A and B/RSV by PCR (GeneXpert) [[id number]]  (Normal) Collected: 03/23/24 0724    Order Status: Completed Lab Status: Final result Updated: 03/23/24 1031    Specimen: Other from Nares      SARS-CoV-2 (COVID-19) - (GeneXpert) Not Detected      Influenza A by PCR Negative     Influenza B by PCR Negative     RSV by PCR Negative    Narrative:      This test is intended for the qualitative detection and differentiation of SARS-CoV-2, influenza A, influenza B, and respiratory syncytial virus (RSV) viral RNA in nasopharyngeal or nares swabs from individuals suspected of respiratory viral infection consistent with COVID-19 by their healthcare provider. Signs and symptoms of respiratory viral infection due to SARS-CoV-2, influenza, and RSV can be similar.    Test performed using the Xpert Xpress SARS-CoV-2/FLU/RSV (real time RT-PCR)  assay on the GeneXpert instrument, [employer], [place] [address].   This test is being used under the Food and Drug Administration's Emergency Use Authorization.    The authorized Fact Sheet for Healthcare Providers for this assay is available upon request from the laboratory.         H&P - H&P Note      H&P signed by Pao Bailey DO at 3/23/2024  3:31 PM  Version 1 of 1    Author: Pao Bailey DO Service: — Author Type: Physician    Filed: 3/23/2024  3:31 PM Date of Service: 3/23/2024 10:03 AM Status: Signed    : Pao Bailey DO (Physician)         [facility]     David Loyola Patient Status:  Inpatient    3/22/1933 MRN [id number]   Location [facility] 7NE-A Attending Pao Bailey DO   Hosp Day # 0 PCP Js Espana DO     Chief Complaint:  fall    Subjective:    History of Present Illness:     David Loyola is a 89 year old male with past medical history of hypertension, hyperlipidemia, urinary retention with chronic Pat who presents ED for unwitnessed fall.  Patient's wife found patient on floor.  Time of fall is unknown.  Patient's family reports patient a day ago that showed pleural effusion.  He has been on diuretics which has not seemed to help.  He has also been on had chest x-ray Macrobid for UTI.  Patient's family reports he has had worsening confusion and has not seem  like himself.  Family reports patient has chronic swelling in right leg that has not changed.  Patient used to be able to ambulate with walker but is now requiring maximal assistance.    History/Other:    Past Medical History:  Past Medical History:   Diagnosis Date    Essential hypertension     Pat catheter in place     H/O transurethral resection of prostate     Hypercholesterolemia     Neuropathy     Urinary retention      Past Surgical History:   History reviewed. No pertinent surgical history.   Family History:   No family history on file.  Social History:    reports that he has never smoked. He has never used smokeless tobacco. He reports that he does not currently use alcohol. He reports that he does not use drugs.     Allergies: No Known Allergies    Medications:    No current facility-administered medications on file prior to encounter.     Current Outpatient Medications on File Prior to Encounter   Medication Sig Dispense Refill    escitalopram 5 MG Oral Tab Take 1 tablet (5 mg total) by mouth daily.      furosemide 40 MG Oral Tab Take 1 tablet (40 mg total) by mouth 2 (two) times daily.      omeprazole 20 MG Oral Capsule Delayed Release Take 1 capsule (20 mg total) by mouth every morning.      potassium chloride 20 MEQ Oral Powd Pack Take 20 mEq by mouth daily.      Multiple Vitamins-Minerals (PRESERVISION AREDS 2) Oral Cap Take 1 capsule by mouth 2 (two) times daily with meals.      finasteride 5 MG Oral Tab Take 1 tablet (5 mg total) by mouth daily. 30 tablet 5    fluticasone propionate 50 MCG/ACT Nasal Suspension 1 spray by Each Nare route daily.      cyanocobalamin 500 MCG Oral Tab Take 1 tablet (500 mcg total) by mouth daily.      docusate sodium 100 MG Oral Cap Take 1 capsule (100 mg total) by mouth 2 (two) times daily.      Multiple Vitamin (MULTI-DAY) Oral Tab Take by mouth.         Review of Systems:   A comprehensive review of systems was completed.    Pertinent positives and negatives noted  in the HPI.    Objective:   Physical Exam:    /54 (BP Location: Left arm)   Pulse 71   Temp 96.8 °F (36 °C) (Axillary)   Resp 21   Wt 150 lb (68 kg)   SpO2 91%   BMI 20.34 kg/m²   General: No acute distress, Alert  Respiratory: No rhonchi, no wheezes  Cardiovascular: S1, S2. Regular rate and rhythm  Abdomen: Soft, Non-tender, non-distended, positive bowel sounds   : Pta in place.   Neuro: No new focal deficits  Extremities: Right lower extremity edema    Results:    Labs:      Labs Last 24 Hours:    Recent Labs   Lab 03/23/24  0725   RBC 4.07   HGB 9.2*   HCT 31.7*   MCV 77.9*   MCH 22.6*   MCHC 29.0*   RDW 20.2   NEPRELIM 7.79*   WBC 11.0   .0       Recent Labs   Lab 03/23/24  0725   GLU 96   BUN 30*   CREATSERUM 1.44*   EGFRCR 46*   CA 8.5   ALB 2.7*   *   K 3.5   *   CO2 27.0   ALKPHO 85   AST 24   ALT 17   BILT 0.5   TP 6.8       Lab Results   Component Value Date    INR 1.20 03/23/2024    INR 1.30 (H) 05/29/2023    INR 1.23 (H) 05/28/2023       Recent Labs   Lab 03/23/24  0725   TROPHS 14   CK 53       No results for input(s): \"TROP\", \"PBNP\" in the last 168 hours.    No results for input(s): \"PCT\" in the last 168 hours.    Imaging: Imaging data reviewed in Epic.    Assessment & Plan:      #Mechanical fall  -CT head reviewed  -CT C-spine reviewed  -PT OT consulted    #Small extra-axial hemorrhage, subdural versus epidural  -CT head reviewed  -Plan for repeat CT head tomorrow  -Neurosurgery following    #Large right pleural effusion  -Chest x-ray reviewed  -Remains on room air  -Echo ordered  -Plan for thoracentesis Monday, possibly sooner if respiratory status declines  -Pulmonology following    #UTI  -Blood cultures in lab  -Urine culture in lab  -IV antibiotics    #Hypernatremia  #GINETTE  -Likely in the setting of dehydration  -IV fluids    #History of urinary retention  -Pat in place prior to arrival    #GERD  -PPI         Plan of care discussed with patient    Pao Bailey,  DO    Supplementary Documentation:     The 21st Century Cures Act makes medical notes like these available to patients in the interest of transparency. Please be advised this is a medical document. Medical documents are intended to carry relevant information, facts as evident, and the clinical opinion of the practitioner. The medical note is intended as peer to peer communication and may appear blunt or direct. It is written in medical language and may contain abbreviations or verbiage that are unfamiliar.               **Certification      PHYSICIAN Certification of Need for Inpatient Hospitalization - Initial Certification    Patient will require inpatient services that will reasonably be expected to span two midnight's based on the clinical documentation in H+P.   Based on patients current state of illness, I anticipate that, after discharge, patient will require TBD.                    Electronically signed by Pao Bailey DO on 3/23/2024  3:31 PM              Consults - MD Consult Notes      Consults signed by Remy Yan MD at 3/25/2024  2:37 PM      Author: Remy Yan MD Service: Cardiology Author Type: Physician    Filed: 3/25/2024  2:37 PM Status: Signed    : Remy Yan MD (Physician)       [facility]    PATIENT'S NAME: NAVYA CASAS   ATTENDING PHYSICIAN: Anand Villalta M.D.   CONSULTING PHYSICIAN: Remy Yan M.D.   PATIENT ACCOUNT#:   [id number]    LOCATION:  [address] [place] [address]  MEDICAL RECORD #:   [id number]       YOB: 1933  ADMISSION DATE:       03/23/2024      CONSULT DATE:  03/25/2024    REPORT OF CONSULTATION    HISTORY OF PRESENT ILLNESS:  A 89-year-old male with history of chronic Pat, had been admitted here before for UTIs.  The patient had an unwitnessed fall.  He was found to have subdural hematoma.  On admission, the patient was found to have a pleural effusion and UTI.  He was started on antibiotics.  Overall, he is doing better.      The patient's  echo showed ejection fraction 35% to 40% with moderate to severe aortic insufficiency.  He had a STANISLAV at [place] in 2023 with normal ejection fraction and a flail chord of the posterior leaflet and moderate aortic insufficiency.  The patient denies any history of syncope.  He does have a chronic Pat.    PAST MEDICAL HISTORY:  Chronic Pat, aortic insufficiency, hypertension, dyslipidemia.    MEDICATIONS:  As listed.    ALLERGIES:  None.    SOCIAL HISTORY:  The patient moved from [place].  He was a former  in that area.  He moved to be near family.    REVIEW OF SYSTEMS:  Denies hemoptysis.  Denies hematemesis.  Denies hematuria.  Rest of review of systems negative except as in HPI.      PHYSICAL EXAMINATION:    VITAL SIGNS:  Blood pressure 130/60, pulse 70.  HEENT:  Pupils equal and reactive to light and accommodation.  LUNGS:  Decreased right base.  HEART:  S1, S2.  A 2/4 diastolic blow.  There is a systolic murmur.  ABDOMEN:  Soft.  EXTREMITIES:  No clubbing, cyanosis, or edema.   NEUROLOGIC:  Grossly intact.    IMPRESSION:    1.   LV dysfunction.  Patient with LV dysfunction and pleural effusion, most likely heart failure.  The patient will undergo thoracentesis.  The patient had ejection fraction of 35% to 40% which is a drop from what he was at [facility].  He has moderate to severe aortic insufficiency and at least mild to moderate mitral regurgitation with a torn chord by STANISLAV at [place].  At the present time, I would continue with Lasix.  I would add losartan.  I do not see a need for guideline-directed medical therapy at his age and discussed this with him and his daughter.  2.   Chronic Pat.  On antibiotics for UTI.  3.   Subdural hematoma.  Would treat conservatively.  Will monitor.  4.   Hypertension.  5.   Dyslipidemia.    Dictated By Remy Yan M.D.  d: 03/25/2024 13:30:59  t: 03/25/2024 13:47:09  Job [id number]  [facility]/    Electronically signed by Remy Yan,  MD on 3/25/2024  2:37 PM           D/C Summary    No notes of this type exist for this encounter.        Physical Therapy Notes (last 72 hours)      Physical Therapy Note signed by Angela Barrios PTA at 3/27/2024  4:49 PM  Version 1 of 1    Author: Angela Barrios PTA Service: Rehab Author Type: Physical Therapy Assistant    Filed: 3/27/2024  4:49 PM Date of Service: 3/27/2024  4:38 PM Status: Signed    : Angela Barrios PTA (Physical Therapy Assistant)          PHYSICAL THERAPY TREATMENT NOTE - INPATIENT    Room Number: 7611/7611-A     Session: 1    Number of Visits to Meet Established Goals: 5    Presenting Problem: AMS-s/p fall, UTI and pleural effusion; acute SDH  Co-Morbidities : HTN, BPH, chronic luis d/t urinary retention, R chronic torn glut med      CT of brain 3/24/24-Impression  CONCLUSION:  Stable noncontrast head CT with stable extra-axial hyperdensity measuring 16 x 19 x 5 mm overlying the right frontal lobe.  This may represent a partially calcified meningioma.  The subdural hemorrhage is a differential consideration.  This   appears stable on follow-up scan.  No interval acute hemorrhage.    CT of cx spine 3/23/24-CONCLUSION:  Degenerative changes involving the cervical spine, but no fracture or subluxation seen.  Partially seen subtotal opacification of the right chest, with only minimal aeration.  Consistent with large right pleural effusion and lung   atelectasis, potential malignant effusion with underlying lung malignancy on the right not excluded.    CT of brain 3/23/24-CONCLUSION:  Focal extra-axial hyperdensity right frontal lobe 16 x 19 x 5 mm.  Differential includes a small extra-axial hemorrhage, such as subdural or epidural hemorrhage, versus a plaque-like discoid meningioma.  No midline shift, mass effect, herniation hydrocephalus.  Suggest CT follow-up.      ASSESSMENT   Patient demonstrates limited progress this session, goals  remain in progress.    Patient  continues to function below baseline with bed mobility, transfers, and gait.  Contributing factors to remaining limitations include decreased functional strength, decreased endurance/aerobic capacity, pain, and impaired   balance.  Next session anticipate patient to progress transfers and gait.  Physical Therapy will continue to follow patient for duration of hospitalization.    Patient continues to benefit from continued skilled PT services: to promote return to prior level of function and safety with continuous assistance and gradual rehabilitative therapy .    PLAN  PT Treatment Plan: Bed mobility;Body mechanics;Endurance;Energy conservation;Patient education;Family education;Gait training;Strengthening;Transfer training;Balance training  Rehab Potential : Good  Frequency (Obs): 3-5x/week    CURRENT GOALS     Goal #1 Patient is able to demonstrate supine - sit EOB @ level: supervision     Goal #2 Patient is able to demonstrate transfers EOB to/from Chair/Wheelchair at assistance level: minimum assistance     Goal #3 Patient is able to ambulate 5 feet with assist device: walker - rolling at assistance level: moderate assistance     Goal #4    Goal #5    Goal #6    Goal Comments: Goals established on 3/24/2024  3/27/2024 all goals ongoing.    SUBJECTIVE  \" I am just laying here.\"    OBJECTIVE  Precautions: Bed/chair alarm;Restraints;Hard of hearing;Other (Comment) (chair alarm and seatbelt; hears from L side better)    WEIGHT BEARING RESTRICTION  Weight Bearing Restriction: None                PAIN ASSESSMENT   Ratin  Location: denies pn       BALANCE                                                                                                                       Static Sitting: Fair  Dynamic Sitting: Fair -           Static Standing: Poor +  Dynamic Standing: Poor    ACTIVITY TOLERANCE                         O2 WALK         AM-PAC '6-Clicks' INPATIENT SHORT FORM - BASIC MOBILITY  How much difficulty does  the patient currently have...  Patient Difficulty: Turning over in bed (including adjusting bedclothes, sheets and blankets)?: A Little   Patient Difficulty: Sitting down on and standing up from a chair with arms (e.g., wheelchair, bedside commode, etc.): A Little   Patient Difficulty: Moving from lying on back to sitting on the side of the bed?: A Little   How much help from another person does the patient currently need...   Help from Another: Moving to and from a bed to a chair (including a wheelchair)?: A Lot   Help from Another: Need to walk in hospital room?: A Lot   Help from Another: Climbing 3-5 steps with a railing?: Total       AM-PAC Score:  Raw Score: 14   Approx Degree of Impairment: 61.29%   Standardized Score (AM-PAC Scale): 38.1   CMS Modifier (G-Code): CL    FUNCTIONAL ABILITY STATUS  Gait Assessment   Functional Mobility/Gait Assessment  Gait Assistance: Moderate assistance  Distance (ft): 4 feet  Assistive Device: Rolling walker  Pattern:  (weakness of radha le's, radha dec quad control, shuffle and R dec stance time, radha knee flex)    Skilled Therapy Provided    Bed Mobility:  Rolling: sup   Supine<>Sit: min assist and cues for sequence of movement. Cues for le movement and trunk elevation.    Sit<>Supine: NT     Transfer Mobility:  Sit<>Stand: cues to scoot to edge, min assist and cues for proper hand placement.    Stand<>Sit: min assist   Gait: RW and mod assist. Patient shows radha knee slightly flexed, dec quad control and dec balance. Cues for safety.     Therapist's Comments:   Education provided on  Benefits of upright position  Exercises   Body mechanics         THERAPEUTIC EXERCISES  Lower Extremity Ankle pumps  Heel raises  Heel slides  LAQ  Quad sets  SAQ  SLR     Upper Extremity      Position Sitting and Supine     Repetitions   12   Sets   1       Patient End of Session: Up in chair;Needs met;Call light within reach;RN aware of session/findings;All patient questions and concerns  addressed;Alarm set    PT Session Time: 30 minutes  Gait Trainin minutes  Therapeutic Activity: 20 minutes  Therapeutic Exercise: 8 minutes   Neuromuscular Re-education: 0 minutes                          Occupational Therapy Notes (last 72 hours)      Occupational Therapy Note signed by Marilou Briones OT at 3/25/2024  4:35 PM  Version 1 of 1    Author: Marilou Briones OT Service: Rehab Author Type: Occupational Therapist    Filed: 3/25/2024  4:35 PM Date of Service: 3/25/2024  2:00 PM Status: Signed    : Marilou Briones OT (Occupational Therapist)       OCCUPATIONAL THERAPY EVALUATION - INPATIENT     Room Number: 7611/7611-A  Evaluation Date: 3/25/2024  Type of Evaluation: Initial  Presenting Problem: AMS, UTI    Physician Order: IP Consult to Occupational Therapy  Reason for Therapy: ADL/IADL Dysfunction and Discharge Planning    OCCUPATIONAL THERAPY ASSESSMENT   Patient is currently functioning below baseline with toileting, upper body dressing, lower body dressing, grooming, bed mobility, transfers, stating sitting balance, dynamic sitting balance, static standing balance, and dynamic standing balance. Prior to admission, patient's baseline is CGA for amb and t/fs and min A x1 person for higher level ADLs but CGA for BADLs.  Patient is requiring moderate assist as a result of the following impairments: decreased functional strength, decreased functional reach, decreased endurance, impaired coordination, impaired motor planning, decreased insight to deficits, and decreased safety awareness. Occupational Therapy will continue to follow for duration of hospitalization.    Patient will benefit from continued skilled OT Services to promote return to prior level of function and safety with continuous assistance and gradual rehabilitative therapy       History Related to Current Admission: Patient is a 89 year old male admitted on 3/23/2024 with Presenting Problem: AMS, UTI. Co-Morbidities :  HTN, BPH, chronic luis d/t urinary retention, R chronic torn glut med    WEIGHT BEARING RESTRICTION  Weight Bearing Restriction: None                Recommendations for nursing staff:   Transfers: Mod A  Toileting location: toilet    EVALUATION SESSION:  Patient Start of Session: supine in bed for session  FUNCTIONAL TRANSFER ASSESSMENT  Sit to Stand: Edge of Bed  Edge of Bed: Minimal Assist (min on first attempt after using bathroom getting up from EOB reuqired mod A to t/f to chair)  Toilet Transfer: Moderate Assist (from low toilet with grab bars)    BED MOBILITY  Rolling: Contact Guard Assist  Supine to Sit : Minimal Assist    BALANCE ASSESSMENT  Static Sitting: Contact Guard Assist  Sitting Bilateral: Contact Guard Assist  Static Standing: Minimal Assist  Standing Bilateral: Moderate Assist (to take steps in room from bed to toilet and back)    FUNCTIONAL ADL ASSESSMENT  Grooming Standing: Minimal Assist (at sink to wash hands and face)  UB Dressing Seated: Minimal Assist (to baron new robe)  LB Dressing Seated: Moderate Assist (to baron new brief, mod A in standing to pull up brief)  Toileting Seated: Moderate Assist (Mod A for agata-care and brief management)      ACTIVITY TOLERANCE: vital stable                         O2 SATURATIONS       COGNITION  Overall Cognitive Status:  Impaired and impulsive and forgetful    Upper Extremity   ROM: within functional limits   Strength: within functional limits   Coordination  Gross motor: WNl  Fine motor: WNL  Sensation: Light touch:  intact    EDUCATION PROVIDED  Patient: Role of Occupational Therapy; Plan of Care  Patient's Response to Education: Verbalized Understanding; Returned Demonstration    Equipment used: RW  Demonstrates functional use, Would benefit from additional trial      Therapist comments: Pt reported fatigue at home, pt educated on work simplification and energy conservation for at home to assist with independence with fatigue at home.    Patient End  of Session: Up in chair;Needs met;Call light within reach;All patient questions and concerns addressed;SCDs in place;Alarm set;Family present    OCCUPATIONAL PROFILE    HOME SITUATION  Type of Home: Assisted living facility ([facility])  Home Layout: One level  Lives With: Spouse;Caregiver part-time    Toilet and Equipment: Comfort height toilet  Shower/Tub and Equipment: Walk-in shower       Occupation/Status: retired  Hand Dominance: Right  Drives: No  Patient Regularly Uses: Reading glasses    Prior Level of Function: Prior to admission, patient's baseline is CGA for amb and t/fs and min A x1 person for higher level ADLs but CGA for BADLs.    SUBJECTIVE   Pt stated, \"I am doing well.\"    PAIN ASSESSMENT  Ratin  Location: no pain at this time       OBJECTIVE  Precautions: Bed/chair alarm;Restraints;Hard of hearing;Other (Comment) (chair alarm and seatbelt; hears from L side better)  Fall Risk: High fall risk      ASSESSMENTS    AM-PAC ‘6-Clicks’ Inpatient Daily Activity Short Form  -   Putting on and taking off regular lower body clothing?: A Lot  -   Bathing (including washing, rinsing, drying)?: A Lot  -   Toileting, which includes using toilet, bedpan or urinal? : A Lot  -   Putting on and taking off regular upper body clothing?: A Little  -   Taking care of personal grooming such as brushing teeth?: A Little  -   Eating meals?: A Little    AM-PAC Score:  Score: 15  Approx Degree of Impairment: 56.46%  Standardized Score (AM-PAC Scale): 34.69    ADDITIONAL TESTS     NEUROLOGICAL FINDINGS      COGNITION ASSESSMENTS       PLAN  OT Treatment Plan: Balance activities;Energy conservation/work simplification techniques;ADL training;IADL training;Continued evaluation;Compensatory technique education;Equipment eval/education;Neuromuscluar reeducation;Patient/Family education;Cognitive reorientation;Endurance training;UE strengthening/ROM;Functional transfer training  Rehab Potential : Good  Frequency:  3-5x/week  Number of Visits to Meet Established Goals: 5    ADL Goals   Patient will perform upper body dressing:  with supervision  Patient will perform lower body dressing:  with supervision  Patient will perform toileting: with supervision    Functional Transfer Goals  Patient will transfer from supine to sit:  with supervision  Patient will transfer from sit to stand:  with supervision  Patient will transfer to toilet:  with supervision    UE Exercise Program Goal  Patient will be supervision with bilateral AROM HEP (home exercise program).    Additional Goals:  Pt will verbalize at least 3 energy conservation techniques  Pt will stand at sink for 5 minutes to complete grooming routine    Patient Evaluation Complexity Level:   Occupational Profile/Medical History MODERATE - Expanded review of history including review of medical or therapy record   Specific performance deficits impacting engagement in ADL/IADL MODERATE  3 - 5 performance deficits   Client Assessment/Performance Deficits MODERATE - Comorbidities and min to mod modifications of tasks    Clinical Decision Making MODERATE - Analysis of occupational profile, detailed assessments, several treatment options    Overall Complexity MODERATE     OT Session Time: 35 minutes  Self-Care Home Management: 25 minutes  Therapeutic Activity: 0 minutes  Neuromuscular Re-education: 0 minutes  Therapeutic Exercise: 0 minutes  Cognitive Skills: 0 minutes  Sensory Integrative: 0 minutes  Orthotic Management and Trainin minutes  Can add/delete any of these                                                         Video Swallow Study Notes    No notes of this type exist for this encounter.        SLP Note - SLP Notes      SLP Note signed by Freya Deluna SLP at 3/28/2024  1:18 PM  Version 1 of 1    Author: Regi, Freya, SLP Service: Rehab Author Type: Speech Pathologist    Filed: 3/28/2024  1:18 PM Date of Service: 3/28/2024 11:16 AM Status: Signed    : Regi  Freya SLP (Speech Pathologist)       SPEECH DAILY NOTE - INPATIENT    ASSESSMENT & PLAN   ASSESSMENT  Pt seen for dysphagia tx to assess tolerance with recommended diet, ensure proper utilization of aspiration precautions and provide pt/family education. Patient received awake, alert, and pleasant. No visitors present. Patient on room air. He is asking when is is going to be discharged. He is aware that he is going to a rehab facility but does not know which one. Patient recalled VFSS however not the results or recommendations. VFSS education completed as well as initiated training in recommended compensatory strategies and aspiration precautions.  Noted straw cup with thin liquids at bedside. Patient educated on pillars of pneumonia as well however given underlying cognitive/mental status, recommend ongoing reinforcement of dysphagia compensatory strategies and aspiration precautions.  Patient expressed understanding.        Diet Recommendations - Solids: Soft/ Easy to chew  Diet Recommendations - Liquids: Nectar thick liquids/ Mildly thick  Compensatory Strategies Recommended: No straws;Slow rate;Alternate consistencies;Small bites and sips;Extra sauce/gravy  Aspiration Precautions: Upright position;Slow rate;Small bites and sips  Medication Administration Recommendations: Present with thickened liquid (take whole or crushed in pureed if any difficulty)    Patient Experiencing Pain: No     Treatment Plan  Treatment Plan/Recommendations: Dysphagia therapy;Aspiration precautions    Interdisciplinary Communication: Discussed with RN            GOALS  Goal #1 The patient will tolerate soft and easy  consistency and nectar thick liquids without overt signs or symptoms of aspiration with 95 % accuracy over 2 session(s).  In Progress   Goal #2 The patient/family/caregiver will demonstrate understanding and implementation of aspiration precautions and swallow strategies independently over 2 session(s).     In Progress    Goal #3 The patient will utilize compensatory strategies as outlined by  BSSE (clinical evaluation) including Slow rate, Small bites, Small sips, Alternate liquids/solids, Upright 90 degrees, Supervision with meals with as needed assistance 100 % of the time across 2 sessions.  In Progress     FOLLOW UP  Follow Up Needed (Documentation Required): Yes  SLP Follow-up Date: 03/29/24  Number of Visits to Meet Established Goals: 2    Session: 1    If you have any questions, please contact SARAH Moseley    Electronically signed by Freya Deluna SLP on 3/28/2024  1:18 PM           Immunizations     Name Date      Covid-19 Pfizer 11/07/21     Covid-19 Pfizer 02/12/21     Covid-19 Pfizer 01/22/21     INFLUENZA defer-10/13/23     Deferral: Contraindication       Multidisciplinary Problems     Active Goals     Not on file